# Patient Record
Sex: FEMALE | Race: BLACK OR AFRICAN AMERICAN | NOT HISPANIC OR LATINO | URBAN - METROPOLITAN AREA
[De-identification: names, ages, dates, MRNs, and addresses within clinical notes are randomized per-mention and may not be internally consistent; named-entity substitution may affect disease eponyms.]

---

## 2021-11-01 DIAGNOSIS — R10.30 LOWER ABDOMINAL PAIN: Primary | ICD-10-CM

## 2021-11-01 DIAGNOSIS — N83.00 FOLLICULAR CYST OF OVARY: ICD-10-CM

## 2021-11-01 DIAGNOSIS — D39.11 NEOPLASM OF UNCERTAIN BEHAVIOR OF RIGHT OVARY: ICD-10-CM

## 2021-12-01 ENCOUNTER — OFFICE VISIT (OUTPATIENT)
Dept: OBSTETRICS AND GYNECOLOGY | Facility: CLINIC | Age: 26
End: 2021-12-01
Payer: COMMERCIAL

## 2021-12-01 VITALS
SYSTOLIC BLOOD PRESSURE: 125 MMHG | DIASTOLIC BLOOD PRESSURE: 80 MMHG | BODY MASS INDEX: 25.33 KG/M2 | WEIGHT: 152 LBS | HEIGHT: 65 IN | HEART RATE: 100 BPM

## 2021-12-01 DIAGNOSIS — N80.9 ENDOMETRIOSIS: ICD-10-CM

## 2021-12-01 DIAGNOSIS — N80.9 ENDOMETRIOSIS DETERMINED BY LAPAROSCOPY: ICD-10-CM

## 2021-12-01 DIAGNOSIS — N83.201 CYST OF RIGHT OVARY: ICD-10-CM

## 2021-12-01 DIAGNOSIS — R10.2 PELVIC PAIN: Primary | ICD-10-CM

## 2021-12-01 PROCEDURE — 99203 PR OFFICE/OUTPT VISIT, NEW, LEVL III, 30-44 MIN: ICD-10-PCS | Mod: S$GLB,,, | Performed by: OBSTETRICS & GYNECOLOGY

## 2021-12-01 PROCEDURE — 99203 OFFICE O/P NEW LOW 30 MIN: CPT | Mod: S$GLB,,, | Performed by: OBSTETRICS & GYNECOLOGY

## 2021-12-03 ENCOUNTER — TELEPHONE (OUTPATIENT)
Dept: OBSTETRICS AND GYNECOLOGY | Facility: CLINIC | Age: 26
End: 2021-12-03
Payer: COMMERCIAL

## 2021-12-09 ENCOUNTER — TELEPHONE (OUTPATIENT)
Dept: OBSTETRICS AND GYNECOLOGY | Facility: CLINIC | Age: 26
End: 2021-12-09
Payer: COMMERCIAL

## 2021-12-17 ENCOUNTER — TELEPHONE (OUTPATIENT)
Dept: OBSTETRICS AND GYNECOLOGY | Facility: CLINIC | Age: 26
End: 2021-12-17
Payer: COMMERCIAL

## 2021-12-23 DIAGNOSIS — N80.9 ENDOMETRIOSIS: ICD-10-CM

## 2021-12-23 DIAGNOSIS — R10.2 PELVIC PAIN: Primary | ICD-10-CM

## 2021-12-23 DIAGNOSIS — N83.201 CYST OF RIGHT OVARY: ICD-10-CM

## 2022-01-18 ENCOUNTER — OFFICE VISIT (OUTPATIENT)
Dept: OBSTETRICS AND GYNECOLOGY | Facility: CLINIC | Age: 27
End: 2022-01-18
Payer: COMMERCIAL

## 2022-01-18 VITALS
HEART RATE: 84 BPM | WEIGHT: 150 LBS | BODY MASS INDEX: 24.96 KG/M2 | DIASTOLIC BLOOD PRESSURE: 82 MMHG | SYSTOLIC BLOOD PRESSURE: 121 MMHG

## 2022-01-18 DIAGNOSIS — R10.2 PELVIC PAIN: ICD-10-CM

## 2022-01-18 DIAGNOSIS — N83.201 CYST OF RIGHT OVARY: Primary | ICD-10-CM

## 2022-01-18 LAB
ANION GAP SERPL CALC-SCNC: 7 MMOL/L (ref 3–11)
ANISOCYTOSIS: NORMAL
APPEARANCE, UA: CLEAR
B-HCG UR QL: NEGATIVE
BACTERIA SPEC CULT: ABNORMAL /HPF
BASOPHILS NFR BLD: 0.8 % (ref 0–3)
BILIRUB UR QL STRIP: NEGATIVE MG/DL
BUN SERPL-MCNC: 8 MG/DL (ref 7–18)
BUN/CREAT SERPL: 11.11 RATIO (ref 7–18)
CALCIUM SERPL-MCNC: 9 MG/DL (ref 8.8–10.5)
CHLORIDE SERPL-SCNC: 106 MMOL/L (ref 100–108)
CO2 SERPL-SCNC: 27 MMOL/L (ref 21–32)
COLOR UR: ABNORMAL
CREAT SERPL-MCNC: 0.72 MG/DL (ref 0.55–1.02)
EOSINOPHIL NFR BLD: 4 % (ref 1–3)
ERYTHROCYTE [DISTWIDTH] IN BLOOD BY AUTOMATED COUNT: 18.7 % (ref 12.5–18)
GFR ESTIMATION: > 60
GLUCOSE (UA): NORMAL MG/DL
GLUCOSE SERPL-MCNC: 80 MG/DL (ref 70–110)
HCT VFR BLD AUTO: 28.4 % (ref 37–47)
HGB BLD-MCNC: 7.5 G/DL (ref 12–16)
HGB UR QL STRIP: 150 /UL
HYPOCHROMIA BLD QL SMEAR: NORMAL
KETONES UR QL STRIP: NEGATIVE MG/DL
LEUKOCYTE ESTERASE UR QL STRIP: NEGATIVE /UL
LYMPHOCYTES NFR BLD: 32.3 % (ref 25–40)
MCH RBC QN AUTO: 16.7 PG (ref 27–31.2)
MCHC RBC AUTO-ENTMCNC: 26.4 G/DL (ref 31.8–35.4)
MCV RBC AUTO: 63.4 FL (ref 80–97)
MICROCYTES BLD QL SMEAR: NORMAL
MONOCYTES NFR BLD: 11.5 % (ref 1–15)
NEUTROPHILS # BLD AUTO: 2.05 10*3/UL (ref 1.8–7.7)
NEUTROPHILS NFR BLD: 51.1 % (ref 37–80)
NITRITE UR QL STRIP: NEGATIVE
NUCLEATED RED BLOOD CELLS: 0 %
PH UR STRIP: 6.5 PH (ref 5–9)
PLATELETS: 490 10*3/UL (ref 142–424)
POTASSIUM SERPL-SCNC: 4.3 MMOL/L (ref 3.6–5.2)
PROT UR QL STRIP: NEGATIVE MG/DL
RBC # BLD AUTO: 4.48 10*6/UL (ref 4.2–5.4)
RBC #/AREA URNS HPF: ABNORMAL /HPF (ref 0–2)
SERVICE COMMENT 03: ABNORMAL
SODIUM BLD-SCNC: 140 MMOL/L (ref 135–145)
SP GR UR STRIP: 1.01 (ref 1–1.03)
SPECIMEN COLLECTION METHOD, URINE: ABNORMAL
SQUAMOUS EPITHELIAL, UA: ABNORMAL /LPF
TARGETS: NORMAL
UROBILINOGEN UR STRIP-ACNC: NORMAL MG/DL
WBC # BLD: 4 10*3/UL (ref 4.6–10.2)
WBC #/AREA URNS HPF: ABNORMAL /HPF (ref 0–5)

## 2022-01-18 PROCEDURE — 99499 UNLISTED E&M SERVICE: CPT | Mod: S$GLB,,, | Performed by: OBSTETRICS & GYNECOLOGY

## 2022-01-18 PROCEDURE — 99499 NO LOS: ICD-10-PCS | Mod: S$GLB,,, | Performed by: OBSTETRICS & GYNECOLOGY

## 2022-01-18 NOTE — PROGRESS NOTES
26 y.o.  presents for pre-op H&P for pelvic pain and ov mass.  Patient's last menstrual period was 2022..    No past medical history on file.  Past Surgical History:   Procedure Laterality Date    DIAGNOSTIC LAPAROSCOPY      Removal of endometriomas     No family history on file.  Review of patient's allergies indicates:  No Known Allergies  No current outpatient medications on file.  Social History     Socioeconomic History    Marital status: Single   Tobacco Use    Smoking status: Never Smoker    Smokeless tobacco: Never Used   Substance and Sexual Activity    Alcohol use: Never    Drug use: Never    Sexual activity: Yes     Partners: Male     Birth control/protection: None     Comment: TTC       Review of Systems   Constitutional: Negative for chills and fever.   Respiratory: Negative for shortness of breath.    Cardiovascular: Negative for chest pain.   Gastrointestinal: Positive for abdominal pain. Negative for blood in stool, constipation, diarrhea, nausea, vomiting and reflux.   Genitourinary: Negative for dysmenorrhea, dyspareunia, dysuria, hematuria, hot flashes, menorrhagia, menstrual problem, pelvic pain, vaginal bleeding, vaginal discharge, postcoital bleeding and vaginal dryness.   Musculoskeletal: Negative for arthralgias and joint swelling.   Integumentary:  Negative for rash and hair changes.   Psychiatric/Behavioral: Negative for depression. The patient is not nervous/anxious.        Vitals:    22 0909   BP: 121/82   Pulse: 84       Physical Exam:   Constitutional: She appears well-developed and well-nourished. No distress.    HENT:   Head: Normocephalic.    Eyes: Conjunctivae and EOM are normal.    Neck: No tracheal deviation present. No thyromegaly present.    Cardiovascular: Exam reveals no clubbing, no cyanosis and no edema.     Pulmonary/Chest: Effort normal. No respiratory distress.        Abdominal: Soft. She exhibits mass.             Musculoskeletal: Normal range  of motion and moves all extremeties.        Skin: No rash noted. She is not diaphoretic. No cyanosis. Nails show no clubbing.    Psychiatric: She has a normal mood and affect. Her behavior is normal. Judgment and thought content normal.         Last ultrasound 9 cm cyst    Assessment: ov cyst and plevic pain    Plan: dx scope, cystectomy, chromotubation  I have discussed the risks, benefits, indications, and alternatives of the procedure in detail.  The patient verbalizes her understanding.  All questions answered.  Consents signed.  The patient agrees to proceed to proceed as planned.

## 2022-01-19 ENCOUNTER — OUTSIDE PLACE OF SERVICE (OUTPATIENT)
Dept: OBSTETRICS AND GYNECOLOGY | Facility: CLINIC | Age: 27
End: 2022-01-19

## 2022-01-19 ENCOUNTER — OUTSIDE PLACE OF SERVICE (OUTPATIENT)
Dept: OBSTETRICS AND GYNECOLOGY | Facility: CLINIC | Age: 27
End: 2022-01-19
Payer: COMMERCIAL

## 2022-01-19 PROCEDURE — 58662 PR LAP,FULGURATE/EXCISE LESIONS: ICD-10-PCS | Mod: 80,,, | Performed by: STUDENT IN AN ORGANIZED HEALTH CARE EDUCATION/TRAINING PROGRAM

## 2022-01-19 PROCEDURE — 58662 LAPAROSCOPY EXCISE LESIONS: CPT | Mod: ,,, | Performed by: OBSTETRICS & GYNECOLOGY

## 2022-01-19 PROCEDURE — 58662 PR LAP,FULGURATE/EXCISE LESIONS: ICD-10-PCS | Mod: ,,, | Performed by: OBSTETRICS & GYNECOLOGY

## 2022-01-19 PROCEDURE — 58662 LAPAROSCOPY EXCISE LESIONS: CPT | Mod: 80,,, | Performed by: STUDENT IN AN ORGANIZED HEALTH CARE EDUCATION/TRAINING PROGRAM

## 2022-01-20 LAB — SPECIMEN TO PATHOLOGY: NORMAL

## 2022-02-01 ENCOUNTER — OFFICE VISIT (OUTPATIENT)
Dept: OBSTETRICS AND GYNECOLOGY | Facility: CLINIC | Age: 27
End: 2022-02-01
Payer: COMMERCIAL

## 2022-02-01 ENCOUNTER — PATIENT MESSAGE (OUTPATIENT)
Dept: OBSTETRICS AND GYNECOLOGY | Facility: CLINIC | Age: 27
End: 2022-02-01

## 2022-02-01 VITALS
HEART RATE: 89 BPM | DIASTOLIC BLOOD PRESSURE: 79 MMHG | WEIGHT: 146 LBS | SYSTOLIC BLOOD PRESSURE: 115 MMHG | BODY MASS INDEX: 24.3 KG/M2

## 2022-02-01 DIAGNOSIS — Z09 POSTOP CHECK: Primary | ICD-10-CM

## 2022-02-01 DIAGNOSIS — D64.9 ANEMIA, UNSPECIFIED TYPE: ICD-10-CM

## 2022-02-01 PROCEDURE — 99499 NO LOS: ICD-10-PCS | Mod: S$GLB,,, | Performed by: OBSTETRICS & GYNECOLOGY

## 2022-02-01 PROCEDURE — 99499 UNLISTED E&M SERVICE: CPT | Mod: S$GLB,,, | Performed by: OBSTETRICS & GYNECOLOGY

## 2022-02-01 NOTE — PROGRESS NOTES
CC: Postop visit    Jhoana Yañez is a 26 y.o. female  post-op from a dx scope and cyst drainage.  Patient is doing well without complaints.     The pathology revealed:  Serous cystadenoma    No past medical history on file.  Past Surgical History:   Procedure Laterality Date    DIAGNOSTIC LAPAROSCOPY      Removal of endometriomas         ROS:  GENERAL: No fever, chills, fatigability or weight loss.  VULVAR: No pain, no lesions and no itching.  VAGINAL: No relaxation, no itching, no discharge, no abnormal bleeding and no lesions.  ABDOMEN: No abdominal pain. Denies nausea. Denies vomiting. No diarrhea. No constipation  BREAST: Denies pain. No lumps. No discharge.  URINARY: No incontinence, no nocturia, no frequency and no dysuria.  CARDIOVASCULAR: No chest pain. No shortness of breath. No leg cramps.  NEUROLOGICAL: No headaches. No vision changes.    PE:     /79   Pulse 89   Wt 66.2 kg (146 lb)   LMP 2022   BMI 24.30 kg/m²     PELVIC: Normal external female genitalia without lesions. Normal hair distribution. Adequate perineal body, normal urethral meatus. Vagina moist and without lesions or discharge. No significant cystocele or rectocele. Uterus and cervix surgically absent. Vaginal cuff intact and appears normal. Bimanual exam revealed no masses, tenderness or abnormality.              ICD-10-CM ICD-9-CM    1. Postop check  Z09 V67.00    2. Anemia, unspecified type  D64.9 285.9 Ambulatory referral/consult to Hematology / Oncology         No follow-ups on file.    [facility]  Don referal

## 2022-02-02 ENCOUNTER — TELEPHONE (OUTPATIENT)
Dept: HEMATOLOGY/ONCOLOGY | Facility: CLINIC | Age: 27
End: 2022-02-02
Payer: COMMERCIAL

## 2022-02-02 NOTE — TELEPHONE ENCOUNTER
Spoke with the patient regarding referral. Appointment date and time provided. Location given. TTRN

## 2022-02-03 ENCOUNTER — OFFICE VISIT (OUTPATIENT)
Dept: HEMATOLOGY/ONCOLOGY | Facility: CLINIC | Age: 27
End: 2022-02-03
Payer: COMMERCIAL

## 2022-02-03 VITALS
HEART RATE: 85 BPM | BODY MASS INDEX: 24.61 KG/M2 | HEIGHT: 65 IN | OXYGEN SATURATION: 100 % | TEMPERATURE: 99 F | DIASTOLIC BLOOD PRESSURE: 75 MMHG | WEIGHT: 147.69 LBS | RESPIRATION RATE: 16 BRPM | SYSTOLIC BLOOD PRESSURE: 114 MMHG

## 2022-02-03 DIAGNOSIS — D50.9 HYPOCHROMIC MICROCYTIC ANEMIA: Primary | ICD-10-CM

## 2022-02-03 DIAGNOSIS — D64.89 ANEMIA DUE TO OTHER CAUSE, NOT CLASSIFIED: ICD-10-CM

## 2022-02-03 DIAGNOSIS — D64.9 ANEMIA, UNSPECIFIED TYPE: ICD-10-CM

## 2022-02-03 LAB
% SATURATION: 4 % (ref 20–50)
ANISOCYTOSIS: NORMAL
BASOPHILS NFR BLD: 1.1 % (ref 0–3)
EOSINOPHIL NFR BLD: 8.2 % (ref 1–3)
ERYTHROCYTE [DISTWIDTH] IN BLOOD BY AUTOMATED COUNT: 20.7 % (ref 12.5–18)
HCT VFR BLD AUTO: 31.9 % (ref 37–47)
HGB BLD-MCNC: 8.4 G/DL (ref 12–16)
HYPOCHROMIA BLD QL SMEAR: NORMAL
IRON: 14 UG/DL (ref 26–170)
LYMPHOCYTES NFR BLD: 30.1 % (ref 25–40)
MCH RBC QN AUTO: 17 PG (ref 27–31.2)
MCHC RBC AUTO-ENTMCNC: 26.3 G/DL (ref 31.8–35.4)
MCV RBC AUTO: 64.7 FL (ref 80–97)
MICROCYTES BLD QL SMEAR: NORMAL
MONOCYTES NFR BLD: 13.1 % (ref 1–15)
NEUTROPHILS # BLD AUTO: 2.12 10*3/UL (ref 1.8–7.7)
NEUTROPHILS NFR BLD: 47.3 % (ref 37–80)
NUCLEATED RED BLOOD CELLS: 0 %
PLATELETS: 432 10*3/UL (ref 142–424)
RBC # BLD AUTO: 4.93 10*6/UL (ref 4.2–5.4)
TOTAL IRON BINDING CAPACITY: 355 UG/DL (ref 250–450)
WBC # BLD: 4.5 10*3/UL (ref 4.6–10.2)

## 2022-02-03 PROCEDURE — 99205 PR OFFICE/OUTPT VISIT, NEW, LEVL V, 60-74 MIN: ICD-10-PCS | Mod: S$GLB,,, | Performed by: INTERNAL MEDICINE

## 2022-02-03 PROCEDURE — 99205 OFFICE O/P NEW HI 60 MIN: CPT | Mod: S$GLB,,, | Performed by: INTERNAL MEDICINE

## 2022-02-03 NOTE — PROGRESS NOTES
HEMATOLOGY INITIAL CONSULTATION NOTE    Patient ID: Jhoana Yañez is a 26 y.o. female.    Chief Complaint: Anemia    HPI:  Patient is a 26-year-old female  who recently underwent a diagnostic Laparo scopy for removal of endometriomas and cyst drainage by OBGYN service.  She was noted to be severely anemic with hemoglobin of 7.5 an MCV of 63.4.  She was referred to us for further evaluation and presents to our clinic.  Reports feeling tired and fatigue all the time.  Denies any history of sickle cell or thalassemias in the family.          Pathology:    OVARY, RIGHT CYST, EXCISION:   - CHRONICALLY INFLAMED BENIGN SEROUS CYSTADENOMA        Past Medical History:   Diagnosis Date    Anemia     Endometriosis        Family History   Problem Relation Age of Onset    Hypertension Mother        Social History     Socioeconomic History    Marital status: Single   Tobacco Use    Smoking status: Never Smoker    Smokeless tobacco: Never Used   Substance and Sexual Activity    Alcohol use: Never    Drug use: Never    Sexual activity: Yes     Partners: Male     Birth control/protection: None     Comment: TTC         Past Surgical History:   Procedure Laterality Date    DIAGNOSTIC LAPAROSCOPY      Removal of endometriomas       Review of systems:  Review of Systems   Constitutional: Positive for activity change and fatigue. Negative for appetite change, chills, diaphoresis and unexpected weight change.   HENT: Negative for congestion, facial swelling, hearing loss, mouth sores, trouble swallowing and voice change.    Eyes: Negative for photophobia, pain, discharge and itching.   Respiratory: Negative for apnea, cough, choking, chest tightness and shortness of breath.    Cardiovascular: Negative for chest pain, palpitations and leg swelling.   Gastrointestinal: Negative for abdominal distention, abdominal pain, anal bleeding and blood in stool.   Endocrine: Negative for cold intolerance, heat  intolerance, polydipsia and polyphagia.   Genitourinary: Negative for difficulty urinating, dysuria, flank pain and hematuria.        +ve for abdominal pain and recent laparoscopy. See HPI   Musculoskeletal: Negative for arthralgias, back pain, joint swelling, myalgias, neck pain and neck stiffness.   Skin: Negative for color change, pallor and wound.   Allergic/Immunologic: Negative for environmental allergies, food allergies and immunocompromised state.   Neurological: Negative for dizziness, seizures, facial asymmetry, speech difficulty, light-headedness, numbness and headaches.   Hematological: Negative for adenopathy. Does not bruise/bleed easily.   Psychiatric/Behavioral: Negative for agitation, behavioral problems, confusion, decreased concentration and sleep disturbance.               Physical Exam  Vitals and nursing note reviewed.   Constitutional:       General: She is not in acute distress.     Appearance: Normal appearance. She is not ill-appearing.      Comments: +ve for pallor   HENT:      Head: Normocephalic and atraumatic.      Nose: No congestion or rhinorrhea.   Eyes:      General: No scleral icterus.     Extraocular Movements: Extraocular movements intact.      Pupils: Pupils are equal, round, and reactive to light.   Cardiovascular:      Rate and Rhythm: Normal rate and regular rhythm.      Pulses: Normal pulses.      Heart sounds: Normal heart sounds. No murmur heard.  No gallop.    Pulmonary:      Effort: Pulmonary effort is normal. No respiratory distress.      Breath sounds: Normal breath sounds. No stridor. No wheezing or rhonchi.   Abdominal:      General: Bowel sounds are normal. There is no distension.      Palpations: There is no mass.      Tenderness: There is no abdominal tenderness. There is no guarding.   Musculoskeletal:         General: No swelling, tenderness, deformity or signs of injury. Normal range of motion.      Cervical back: Normal range of motion and neck supple. No  rigidity. No muscular tenderness.      Right lower leg: No edema.      Left lower leg: No edema.   Skin:     General: Skin is warm.      Coloration: Skin is not jaundiced or pale.      Findings: No bruising or lesion.   Neurological:      General: No focal deficit present.      Mental Status: She is alert and oriented to person, place, and time.      Cranial Nerves: No cranial nerve deficit.      Sensory: No sensory deficit.      Motor: No weakness.      Gait: Gait normal.   Psychiatric:         Mood and Affect: Mood normal.         Behavior: Behavior normal.         Thought Content: Thought content normal.       Vitals:    02/03/22 1058   BP: 114/75   Pulse: 85   Resp: 16   Temp: 98.5 °F (36.9 °C)   Body surface area is 1.75 meters squared.    Assessment/Plan:       1. Anemia:    == Reviewed recent labs done by OBGYN service where she was noted to have a hemoglobin of 7.5 with hematocrit of 28.4 with extremely microcytic, hypochromic anemia.  She was also noted to have anisocytosis 1+ and elevated platelet count at 490 which is likely physiological bone marrow response to anemia.   == Since she has extreme microcytosis thalassemia is in the differential but looking at her clinical history and picture findings strongly suggest iron deficiency anemia.  Will obtain iron profile for completion.  I will also obtain hemoglobin electrophoresis to rule out underlying thalassemia.  In the interim I would recommend she starts on p.o. iron supplementation and take vitamin-C to enhance absorption.  If no improvement noted in the next 4-6 weeks based on her blood counts then would recommend IV iron supplementation with Feraheme.    Iron profile  Hgb electrophoresis    RTC in 6 weeks for MD visit with labs prior: CBC, reticulocytes, iron profile    Pt is instructed to RTC with labs for continued monitoring of treatment as instructed.     Total time spent in counseling and discussion about further management options including  relevant lab work, treatment,  prognosis, medications and intended side effects was more than 60 minutes. More than 50 % of the time was spent in counseling and coordination of care.  I spent a total of 60 minutes on the day of the visit.This includes face to face time and non-face to face time preparing to see the patient (eg, review of tests), Obtaining and/or reviewing separately obtained history, Documenting clinical information in the electronic or other health record, Independently interpreting resultsand communicating results to the patient/family/caregiver, or Care coordination.

## 2022-02-07 DIAGNOSIS — N83.201 CYST OF RIGHT OVARY: Primary | ICD-10-CM

## 2022-02-07 RX ORDER — NORGESTIMATE AND ETHINYL ESTRADIOL 7DAYSX3 28
1 KIT ORAL DAILY
Qty: 30 TABLET | Refills: 11 | Status: SHIPPED | OUTPATIENT
Start: 2022-02-07 | End: 2023-02-16 | Stop reason: ALTCHOICE

## 2022-02-08 LAB
HEMOGLOBIN A1: 98.4 % (ref 95–97.9)
HEMOGLOBIN A2: 1.4 % (ref 2–3.5)
HEMOGLOBIN C: 0 % (ref 0–0)
HEMOGLOBIN E: 0 % (ref 0–0)
HEMOGLOBIN F: 0.2 % (ref 0–2.1)
HEMOGLOBIN OTHER: ABNORMAL % (ref 0–0)
HEMOGLOBIN S: 0 % (ref 0–0)
HEMOGLOBIN, CAPILLARY ELECTROPHORESIS: ABNORMAL
HEMOGLOBINOPATHY INTERPRETATION: ABNORMAL
SICKLE CELL SCREEN: ABNORMAL

## 2022-03-03 ENCOUNTER — PATIENT MESSAGE (OUTPATIENT)
Dept: OBSTETRICS AND GYNECOLOGY | Facility: CLINIC | Age: 27
End: 2022-03-03
Payer: COMMERCIAL

## 2022-03-03 DIAGNOSIS — N80.9 ENDOMETRIOSIS: Primary | ICD-10-CM

## 2022-03-03 RX ORDER — DROSPIRENONE AND ETHINYL ESTRADIOL 0.02-3(28)
1 KIT ORAL DAILY
Qty: 30 TABLET | Refills: 11 | Status: SHIPPED | OUTPATIENT
Start: 2022-03-03 | End: 2023-02-16 | Stop reason: ALTCHOICE

## 2022-03-16 DIAGNOSIS — D50.9 HYPOCHROMIC MICROCYTIC ANEMIA: Primary | ICD-10-CM

## 2022-03-17 ENCOUNTER — OFFICE VISIT (OUTPATIENT)
Dept: HEMATOLOGY/ONCOLOGY | Facility: CLINIC | Age: 27
End: 2022-03-17
Payer: COMMERCIAL

## 2022-03-17 ENCOUNTER — CLINICAL SUPPORT (OUTPATIENT)
Dept: HEMATOLOGY/ONCOLOGY | Facility: CLINIC | Age: 27
End: 2022-03-17
Payer: COMMERCIAL

## 2022-03-17 VITALS
TEMPERATURE: 98 F | SYSTOLIC BLOOD PRESSURE: 111 MMHG | WEIGHT: 148.19 LBS | DIASTOLIC BLOOD PRESSURE: 78 MMHG | BODY MASS INDEX: 24.69 KG/M2 | OXYGEN SATURATION: 99 % | RESPIRATION RATE: 16 BRPM | HEART RATE: 70 BPM | HEIGHT: 65 IN

## 2022-03-17 DIAGNOSIS — D50.9 HYPOCHROMIC MICROCYTIC ANEMIA: ICD-10-CM

## 2022-03-17 DIAGNOSIS — D50.9 HYPOCHROMIC MICROCYTIC ANEMIA: Primary | ICD-10-CM

## 2022-03-17 PROCEDURE — 99214 OFFICE O/P EST MOD 30 MIN: CPT | Mod: S$GLB,,, | Performed by: NURSE PRACTITIONER

## 2022-03-17 PROCEDURE — 99214 PR OFFICE/OUTPT VISIT, EST, LEVL IV, 30-39 MIN: ICD-10-PCS | Mod: S$GLB,,, | Performed by: NURSE PRACTITIONER

## 2022-03-17 NOTE — PROGRESS NOTES
HEMATOLOGY INITIAL CONSULTATION NOTE    Patient ID: Jhoana Yañez is a 26 y.o. female.    Chief Complaint: Anemia    HPI:  Patient is a 26-year-old female  who recently underwent a diagnostic Laparo scopy for removal of endometriomas and cyst drainage by OBGYN service.  She was noted to be severely anemic with hemoglobin of 7.5 an MCV of 63.4.  She was referred to us for further evaluation and presents to our clinic.  Reports feeling tired and fatigue all the time.  Denies any history of sickle cell or thalassemias in the family.          Pathology:    OVARY, RIGHT CYST, EXCISION:   - CHRONICALLY INFLAMED BENIGN SEROUS CYSTADENOMA          Past Medical History:   Diagnosis Date    Anemia     Endometriosis        Family History   Problem Relation Age of Onset    Hypertension Mother        Social History     Socioeconomic History    Marital status:    Tobacco Use    Smoking status: Never Smoker    Smokeless tobacco: Never Used   Substance and Sexual Activity    Alcohol use: Never    Drug use: Never    Sexual activity: Yes     Partners: Male     Birth control/protection: None     Comment: TTC         Past Surgical History:   Procedure Laterality Date    DIAGNOSTIC LAPAROSCOPY      Removal of endometriomas       Review of systems:  Review of Systems   Constitutional: Positive for activity change and fatigue. Negative for appetite change, chills, diaphoresis and unexpected weight change.   HENT: Negative for congestion, facial swelling, hearing loss, mouth sores, trouble swallowing and voice change.    Eyes: Negative for photophobia, pain, discharge and itching.   Respiratory: Negative for apnea, cough, choking, chest tightness and shortness of breath.    Cardiovascular: Negative for chest pain, palpitations and leg swelling.   Gastrointestinal: Negative for abdominal distention, abdominal pain, anal bleeding and blood in stool.   Endocrine: Negative for cold intolerance, heat  intolerance, polydipsia and polyphagia.   Genitourinary: Negative for difficulty urinating, dysuria, flank pain and hematuria.        +ve for abdominal pain and recent laparoscopy. See HPI   Musculoskeletal: Negative for arthralgias, back pain, joint swelling, myalgias, neck pain and neck stiffness.   Skin: Negative for color change, pallor and wound.   Allergic/Immunologic: Negative for environmental allergies, food allergies and immunocompromised state.   Neurological: Negative for dizziness, seizures, facial asymmetry, speech difficulty, light-headedness, numbness and headaches.   Hematological: Negative for adenopathy. Does not bruise/bleed easily.   Psychiatric/Behavioral: Negative for agitation, behavioral problems, confusion, decreased concentration and sleep disturbance.               Physical Exam  Vitals and nursing note reviewed.   Constitutional:       General: She is not in acute distress.     Appearance: Normal appearance. She is not ill-appearing.      Comments: +ve for pallor   HENT:      Head: Normocephalic and atraumatic.      Nose: No congestion or rhinorrhea.   Eyes:      General: No scleral icterus.     Extraocular Movements: Extraocular movements intact.      Pupils: Pupils are equal, round, and reactive to light.   Cardiovascular:      Rate and Rhythm: Normal rate and regular rhythm.      Pulses: Normal pulses.      Heart sounds: Normal heart sounds. No murmur heard.    No gallop.   Pulmonary:      Effort: Pulmonary effort is normal. No respiratory distress.      Breath sounds: Normal breath sounds. No stridor. No wheezing or rhonchi.   Abdominal:      General: Bowel sounds are normal. There is no distension.      Palpations: There is no mass.      Tenderness: There is no abdominal tenderness. There is no guarding.   Musculoskeletal:         General: No swelling, tenderness, deformity or signs of injury. Normal range of motion.      Cervical back: Normal range of motion and neck supple. No  rigidity. No muscular tenderness.      Right lower leg: No edema.      Left lower leg: No edema.   Skin:     General: Skin is warm.      Coloration: Skin is not jaundiced or pale.      Findings: No bruising or lesion.   Neurological:      General: No focal deficit present.      Mental Status: She is alert and oriented to person, place, and time.      Cranial Nerves: No cranial nerve deficit.      Sensory: No sensory deficit.      Motor: No weakness.      Gait: Gait normal.   Psychiatric:         Mood and Affect: Mood normal.         Behavior: Behavior normal.         Thought Content: Thought content normal.       There were no vitals filed for this visit.There is no height or weight on file to calculate BSA.    Assessment/Plan:       1. Anemia:    == Reviewed recent labs done by OBGYN service where she was noted to have a hemoglobin of 7.5 with hematocrit of 28.4 with extremely microcytic, hypochromic anemia.  She was also noted to have anisocytosis 1+ and elevated platelet count at 490 which is likely physiological bone marrow response to anemia.   == Since she has extreme microcytosis thalassemia is in the differential but looking at her clinical history and picture findings strongly suggest iron deficiency anemia.  Will obtain iron profile for completion.  I will also obtain hemoglobin electrophoresis to rule out underlying thalassemia.  In the interim I would recommend she starts on p.o. iron supplementation and take vitamin-C to enhance absorption.  If no improvement noted in the next 4-6 weeks based on her blood counts then would recommend IV iron supplementation with Feraheme.    ==3/17/2022: Patient with symptomatic iron deficency anemia. She reports only able to tolerate po iron once daily due to severe nausea. She is able to take one dose before bed, otherwise she has severe nausea. Patient reports fatigue, pica, cold intolerance. Plan is ferriheme then rtc in 2 months with repeat labs    Total time spent  in counseling and discussion about further management options including relevant lab work, treatment,  prognosis, medications and intended side effects was more than 25 minutes. More than 50 % of the time was spent in counseling and coordination of care.  I spent a total of 60 minutes on the day of the visit.This includes face to face time and non-face to face time preparing to see the patient (eg, review of tests), Obtaining and/or reviewing separately obtained history, Documenting clinical information in the electronic or other health record, Independently interpreting resultsand communicating results to the patient/family/caregiver, or Care coordination.

## 2022-03-18 PROBLEM — D50.9 HYPOCHROMIC MICROCYTIC ANEMIA: Status: ACTIVE | Noted: 2022-03-18

## 2022-03-18 RX ORDER — SODIUM CHLORIDE 0.9 % (FLUSH) 0.9 %
10 SYRINGE (ML) INJECTION
Status: CANCELLED | OUTPATIENT
Start: 2022-03-18

## 2022-03-18 RX ORDER — HEPARIN 100 UNIT/ML
500 SYRINGE INTRAVENOUS
Status: CANCELLED | OUTPATIENT
Start: 2022-03-18

## 2022-03-18 RX ORDER — EPINEPHRINE 0.3 MG/.3ML
0.3 INJECTION SUBCUTANEOUS ONCE AS NEEDED
Status: CANCELLED | OUTPATIENT
Start: 2022-03-18

## 2022-03-18 RX ORDER — DIPHENHYDRAMINE HYDROCHLORIDE 50 MG/ML
50 INJECTION INTRAMUSCULAR; INTRAVENOUS ONCE AS NEEDED
Status: CANCELLED | OUTPATIENT
Start: 2022-03-18

## 2022-03-18 RX ORDER — METHYLPREDNISOLONE SOD SUCC 125 MG
125 VIAL (EA) INJECTION ONCE AS NEEDED
Status: CANCELLED | OUTPATIENT
Start: 2022-03-18

## 2022-03-23 DIAGNOSIS — D50.0 ANEMIA DUE TO CHRONIC BLOOD LOSS: Primary | ICD-10-CM

## 2022-03-23 RX ORDER — FERROUS SULFATE 325(65) MG
325 TABLET, DELAYED RELEASE (ENTERIC COATED) ORAL 2 TIMES DAILY
Qty: 60 TABLET | Refills: 6 | Status: SHIPPED | OUTPATIENT
Start: 2022-03-23 | End: 2023-02-16 | Stop reason: ALTCHOICE

## 2022-05-19 ENCOUNTER — OFFICE VISIT (OUTPATIENT)
Dept: HEMATOLOGY/ONCOLOGY | Facility: CLINIC | Age: 27
End: 2022-05-19
Payer: COMMERCIAL

## 2022-05-19 VITALS
OXYGEN SATURATION: 97 % | HEIGHT: 65 IN | RESPIRATION RATE: 18 BRPM | WEIGHT: 156 LBS | SYSTOLIC BLOOD PRESSURE: 116 MMHG | BODY MASS INDEX: 25.99 KG/M2 | DIASTOLIC BLOOD PRESSURE: 77 MMHG | HEART RATE: 74 BPM | TEMPERATURE: 98 F

## 2022-05-19 DIAGNOSIS — D50.9 HYPOCHROMIC MICROCYTIC ANEMIA: Primary | ICD-10-CM

## 2022-05-19 DIAGNOSIS — D50.0 ANEMIA DUE TO CHRONIC BLOOD LOSS: ICD-10-CM

## 2022-05-19 LAB
% SATURATION: 3 % (ref 20–50)
BASOPHILS NFR BLD: 0.5 % (ref 0–3)
EOSINOPHIL NFR BLD: 2.3 % (ref 1–3)
ERYTHROCYTE [DISTWIDTH] IN BLOOD BY AUTOMATED COUNT: 17.9 % (ref 12.5–18)
FERRITIN SERPL-MCNC: 27 NG/ML (ref 8–388)
HCT VFR BLD AUTO: 30.9 % (ref 37–47)
HGB BLD-MCNC: 8.6 G/DL (ref 12–16)
HYPOCHROMIA BLD QL SMEAR: NORMAL
IRON: 11 UG/DL (ref 26–170)
LYMPHOCYTES NFR BLD: 25.8 % (ref 25–40)
MCH RBC QN AUTO: 19 PG (ref 27–31.2)
MCHC RBC AUTO-ENTMCNC: 27.8 G/DL (ref 31.8–35.4)
MCV RBC AUTO: 68.2 FL (ref 80–97)
MICROCYTES BLD QL SMEAR: NORMAL
MONOCYTES NFR BLD: 10.2 % (ref 1–15)
NEUTROPHILS # BLD AUTO: 4.99 10*3/UL (ref 1.8–7.7)
NEUTROPHILS NFR BLD: 60.8 % (ref 37–80)
NUCLEATED RED BLOOD CELLS: 0 %
PLATELETS: 378 10*3/UL (ref 142–424)
RBC # BLD AUTO: 4.53 10*6/UL (ref 4.2–5.4)
RETIC %: 1.4 % (ref 0.1–1.5)
TOTAL IRON BINDING CAPACITY: 363 UG/DL (ref 250–450)
WBC # BLD: 8.2 10*3/UL (ref 4.6–10.2)

## 2022-05-19 PROCEDURE — 99214 PR OFFICE/OUTPT VISIT, EST, LEVL IV, 30-39 MIN: ICD-10-PCS | Mod: S$GLB,,, | Performed by: NURSE PRACTITIONER

## 2022-05-19 PROCEDURE — 99214 OFFICE O/P EST MOD 30 MIN: CPT | Mod: S$GLB,,, | Performed by: NURSE PRACTITIONER

## 2022-05-19 RX ORDER — SODIUM CHLORIDE 9 MG/ML
INJECTION, SOLUTION INTRAVENOUS CONTINUOUS
Status: CANCELLED | OUTPATIENT
Start: 2022-05-24

## 2022-05-19 RX ORDER — DIPHENHYDRAMINE HYDROCHLORIDE 50 MG/ML
50 INJECTION INTRAMUSCULAR; INTRAVENOUS ONCE AS NEEDED
Status: CANCELLED | OUTPATIENT
Start: 2022-05-23

## 2022-05-19 RX ORDER — SODIUM CHLORIDE 0.9 % (FLUSH) 0.9 %
10 SYRINGE (ML) INJECTION
Status: CANCELLED | OUTPATIENT
Start: 2022-05-23

## 2022-05-19 RX ORDER — HEPARIN 100 UNIT/ML
5 SYRINGE INTRAVENOUS
Status: CANCELLED | OUTPATIENT
Start: 2022-05-23

## 2022-05-19 RX ORDER — EPINEPHRINE 0.3 MG/.3ML
0.3 INJECTION SUBCUTANEOUS ONCE AS NEEDED
Status: CANCELLED | OUTPATIENT
Start: 2022-05-23

## 2022-05-19 RX ORDER — METHYLPREDNISOLONE SOD SUCC 125 MG
125 VIAL (EA) INJECTION ONCE AS NEEDED
Status: CANCELLED | OUTPATIENT
Start: 2022-05-23

## 2022-05-19 NOTE — PROGRESS NOTES
HEMATOLOGY INITIAL CONSULTATION NOTE    Patient ID: Jhoana Yañez is a 26 y.o. female.    Chief Complaint: Anemia    HPI:  Patient is a 26-year-old female  who recently underwent a diagnostic Laparo scopy for removal of endometriomas and cyst drainage by OBGYN service.  She was noted to be severely anemic with hemoglobin of 7.5 an MCV of 63.4.  She was referred to us for further evaluation and presents to our clinic.  Reports feeling tired and fatigue all the time.  Denies any history of sickle cell or thalassemias in the family.          Pathology:    OVARY, RIGHT CYST, EXCISION:   - CHRONICALLY INFLAMED BENIGN SEROUS CYSTADENOMA          Past Medical History:   Diagnosis Date    Anemia     Endometriosis        Family History   Problem Relation Age of Onset    Hypertension Mother        Social History     Socioeconomic History    Marital status:    Tobacco Use    Smoking status: Never Smoker    Smokeless tobacco: Never Used   Substance and Sexual Activity    Alcohol use: Never    Drug use: Never    Sexual activity: Yes     Partners: Male     Birth control/protection: None     Comment: TTC         Past Surgical History:   Procedure Laterality Date    DIAGNOSTIC LAPAROSCOPY      Removal of endometriomas       Review of systems:  Review of Systems   Constitutional: Positive for activity change and fatigue. Negative for appetite change, chills, diaphoresis and unexpected weight change.   HENT: Negative for congestion, facial swelling, hearing loss, mouth sores, trouble swallowing and voice change.    Eyes: Negative for photophobia, pain, discharge and itching.   Respiratory: Negative for apnea, cough, choking, chest tightness and shortness of breath.    Cardiovascular: Negative for chest pain, palpitations and leg swelling.   Gastrointestinal: Negative for abdominal distention, abdominal pain, anal bleeding and blood in stool.   Endocrine: Negative for cold intolerance, heat  intolerance, polydipsia and polyphagia.   Genitourinary: Negative for difficulty urinating, dysuria, flank pain and hematuria.        +ve for abdominal pain and recent laparoscopy. See HPI   Musculoskeletal: Negative for arthralgias, back pain, joint swelling, myalgias, neck pain and neck stiffness.   Skin: Negative for color change, pallor and wound.   Allergic/Immunologic: Negative for environmental allergies, food allergies and immunocompromised state.   Neurological: Negative for dizziness, seizures, facial asymmetry, speech difficulty, light-headedness, numbness and headaches.   Hematological: Negative for adenopathy. Does not bruise/bleed easily.   Psychiatric/Behavioral: Negative for agitation, behavioral problems, confusion, decreased concentration and sleep disturbance.               Physical Exam  Vitals and nursing note reviewed.   Constitutional:       General: She is not in acute distress.     Appearance: Normal appearance. She is not ill-appearing.      Comments: +ve for pallor   HENT:      Head: Normocephalic and atraumatic.      Nose: No congestion or rhinorrhea.   Eyes:      General: No scleral icterus.     Extraocular Movements: Extraocular movements intact.      Pupils: Pupils are equal, round, and reactive to light.   Cardiovascular:      Rate and Rhythm: Normal rate and regular rhythm.      Pulses: Normal pulses.      Heart sounds: Normal heart sounds. No murmur heard.    No gallop.   Pulmonary:      Effort: Pulmonary effort is normal. No respiratory distress.      Breath sounds: Normal breath sounds. No stridor. No wheezing or rhonchi.   Abdominal:      General: Bowel sounds are normal. There is no distension.      Palpations: There is no mass.      Tenderness: There is no abdominal tenderness. There is no guarding.   Musculoskeletal:         General: No swelling, tenderness, deformity or signs of injury. Normal range of motion.      Cervical back: Normal range of motion and neck supple. No  rigidity. No muscular tenderness.      Right lower leg: No edema.      Left lower leg: No edema.   Skin:     General: Skin is warm.      Coloration: Skin is not jaundiced or pale.      Findings: No bruising or lesion.   Neurological:      General: No focal deficit present.      Mental Status: She is alert and oriented to person, place, and time.      Cranial Nerves: No cranial nerve deficit.      Sensory: No sensory deficit.      Motor: No weakness.      Gait: Gait normal.   Psychiatric:         Mood and Affect: Mood normal.         Behavior: Behavior normal.         Thought Content: Thought content normal.       Vitals:    05/19/22 1436   BP: 116/77   Pulse: 74   Resp: 18   Temp: 98 °F (36.7 °C)   Body surface area is 1.8 meters squared.    Assessment/Plan:       1. Anemia:    == Reviewed recent labs done by OBGYN service where she was noted to have a hemoglobin of 7.5 with hematocrit of 28.4 with extremely microcytic, hypochromic anemia.  She was also noted to have anisocytosis 1+ and elevated platelet count at 490 which is likely physiological bone marrow response to anemia.   == Since she has extreme microcytosis thalassemia is in the differential but looking at her clinical history and picture findings strongly suggest iron deficiency anemia.  Will obtain iron profile for completion.  I will also obtain hemoglobin electrophoresis to rule out underlying thalassemia.  In the interim I would recommend she starts on p.o. iron supplementation and take vitamin-C to enhance absorption.  If no improvement noted in the next 4-6 weeks based on her blood counts then would recommend IV iron supplementation with Feraheme.    [facility]    ==3/17/2022: Patient with symptomatic iron deficency anemia. She reports only able to tolerate po iron once daily due to severe nausea. She is able to take one dose before bed, otherwise she has severe nausea. Patient reports fatigue, pica, cold intolerance. Plan is ferriheme  then rtc in 2 months with repeat labs    Total time spent in counseling and discussion about further management options including relevant lab work, treatment,  prognosis, medications and intended side effects was more than 25 minutes. More than 50 % of the time was spent in counseling and coordination of care.  I spent a total of 60 minutes on the day of the visit.This includes face to face time and non-face to face time preparing to see the patient (eg, review of tests), Obtaining and/or reviewing separately obtained history, Documenting clinical information in the electronic or other health record, Independently interpreting resultsand communicating results to the patient/family/caregiver, or Care coordination.

## 2022-05-19 NOTE — PROGRESS NOTES
HEMATOLOGY INITIAL CONSULTATION NOTE    Patient ID: Jhoana Yañez is a 26 y.o. female.    Chief Complaint: Anemia    HPI:  Patient is a 26-year-old female  who recently underwent a diagnostic Laparo scopy for removal of endometriomas and cyst drainage by OBGYN service.  She was noted to be severely anemic with hemoglobin of 7.5 an MCV of 63.4.  She was referred to us for further evaluation and presents to our clinic.  Reports feeling tired and fatigue all the time.  Denies any history of sickle cell or thalassemias in the family.          Pathology:    OVARY, RIGHT CYST, EXCISION:   - CHRONICALLY INFLAMED BENIGN SEROUS CYSTADENOMA          Past Medical History:   Diagnosis Date    Anemia     Endometriosis        Family History   Problem Relation Age of Onset    Hypertension Mother        Social History     Socioeconomic History    Marital status:    Tobacco Use    Smoking status: Never Smoker    Smokeless tobacco: Never Used   Substance and Sexual Activity    Alcohol use: Never    Drug use: Never    Sexual activity: Yes     Partners: Male     Birth control/protection: None     Comment: TTC         Past Surgical History:   Procedure Laterality Date    DIAGNOSTIC LAPAROSCOPY      Removal of endometriomas       Review of systems:  Review of Systems   Constitutional: Positive for activity change and fatigue. Negative for appetite change, chills, diaphoresis and unexpected weight change.   HENT: Negative for congestion, facial swelling, hearing loss, mouth sores, trouble swallowing and voice change.    Eyes: Negative for photophobia, pain, discharge and itching.   Respiratory: Negative for apnea, cough, choking, chest tightness and shortness of breath.    Cardiovascular: Negative for chest pain, palpitations and leg swelling.   Gastrointestinal: Negative for abdominal distention, abdominal pain, anal bleeding and blood in stool.   Endocrine: Negative for cold intolerance, heat  intolerance, polydipsia and polyphagia.   Genitourinary: Negative for difficulty urinating, dysuria, flank pain and hematuria.        +ve for abdominal pain and recent laparoscopy. See HPI   Musculoskeletal: Negative for arthralgias, back pain, joint swelling, myalgias, neck pain and neck stiffness.   Skin: Negative for color change, pallor and wound.   Allergic/Immunologic: Negative for environmental allergies, food allergies and immunocompromised state.   Neurological: Negative for dizziness, seizures, facial asymmetry, speech difficulty, light-headedness, numbness and headaches.   Hematological: Negative for adenopathy. Does not bruise/bleed easily.   Psychiatric/Behavioral: Negative for agitation, behavioral problems, confusion, decreased concentration and sleep disturbance.               Physical Exam  Vitals and nursing note reviewed.   Constitutional:       General: She is not in acute distress.     Appearance: Normal appearance. She is not ill-appearing.      Comments: +ve for pallor   HENT:      Head: Normocephalic and atraumatic.      Nose: No congestion or rhinorrhea.   Eyes:      General: No scleral icterus.     Extraocular Movements: Extraocular movements intact.      Pupils: Pupils are equal, round, and reactive to light.   Cardiovascular:      Rate and Rhythm: Normal rate and regular rhythm.      Pulses: Normal pulses.      Heart sounds: Normal heart sounds. No murmur heard.    No gallop.   Pulmonary:      Effort: Pulmonary effort is normal. No respiratory distress.      Breath sounds: Normal breath sounds. No stridor. No wheezing or rhonchi.   Abdominal:      General: Bowel sounds are normal. There is no distension.      Palpations: There is no mass.      Tenderness: There is no abdominal tenderness. There is no guarding.   Musculoskeletal:         General: No swelling, tenderness, deformity or signs of injury. Normal range of motion.      Cervical back: Normal range of motion and neck supple. No  rigidity. No muscular tenderness.      Right lower leg: No edema.      Left lower leg: No edema.   Skin:     General: Skin is warm.      Coloration: Skin is not jaundiced or pale.      Findings: No bruising or lesion.   Neurological:      General: No focal deficit present.      Mental Status: She is alert and oriented to person, place, and time.      Cranial Nerves: No cranial nerve deficit.      Sensory: No sensory deficit.      Motor: No weakness.      Gait: Gait normal.   Psychiatric:         Mood and Affect: Mood normal.         Behavior: Behavior normal.         Thought Content: Thought content normal.       Vitals:    05/19/22 1436   BP: 116/77   Pulse: 74   Resp: 18   Temp: 98 °F (36.7 °C)   Body surface area is 1.8 meters squared.    Assessment/Plan:       1. Anemia:    == Reviewed recent labs done by OBGYN service where she was noted to have a hemoglobin of 7.5 with hematocrit of 28.4 with extremely microcytic, hypochromic anemia.  She was also noted to have anisocytosis 1+ and elevated platelet count at 490 which is likely physiological bone marrow response to anemia.   == Since she has extreme microcytosis thalassemia is in the differential but looking at her clinical history and picture findings strongly suggest iron deficiency anemia.  Will obtain iron profile for completion.  I will also obtain hemoglobin electrophoresis to rule out underlying thalassemia.  In the interim I would recommend she starts on p.o. iron supplementation and take vitamin-C to enhance absorption.  If no improvement noted in the next 4-6 weeks based on her blood counts then would recommend IV iron supplementation with Feraheme.    ==3/17/2022: Patient with symptomatic iron deficency anemia. She reports only able to tolerate po iron once daily due to severe nausea. She is able to take one dose before bed, otherwise she has severe nausea. Patient reports fatigue, pica, cold intolerance. Plan is ferriheme then rtc in 2 months with  repeat labs    ==05/19/2022: Patient with symptomatic iron deficiency anemia. She has tried oral iron which caused nausea and vomiting. She has also tried liquid po iron in past which she also did not tolerate. She reports continued severe fatigue and cold intolerance. IV iron was denied by insurance previously. Will resubmit as patient is increasingly symptomatic and iron dropped from 18 at previous visit to 14 drawn on 5/19/2022. Iron saturation also dropped from 4 to 3. As she is not able to tolerate po iron and continues to have decrease she will likely need blood transfusion in future if IV iron is not approved. Educated on ER precautions if heart palpitations, dyspnea or worsening of symptoms.    Total time spent in counseling and discussion about further management options including relevant lab work, treatment,  prognosis, medications and intended side effects was more than 25 minutes. More than 50 % of the time was spent in counseling and coordination of care.  I spent a total of 60 minutes on the day of the visit.This includes face to face time and non-face to face time preparing to see the patient (eg, review of tests), Obtaining and/or reviewing separately obtained history, Documenting clinical information in the electronic or other health record, Independently interpreting resultsand communicating results to the patient/family/caregiver, or Care coordination.

## 2022-05-20 ENCOUNTER — PATIENT MESSAGE (OUTPATIENT)
Dept: HEMATOLOGY/ONCOLOGY | Facility: CLINIC | Age: 27
End: 2022-05-20
Payer: COMMERCIAL

## 2023-01-05 DIAGNOSIS — D64.9 ANEMIA, UNSPECIFIED TYPE: Primary | ICD-10-CM

## 2023-01-06 ENCOUNTER — TELEPHONE (OUTPATIENT)
Dept: HEMATOLOGY/ONCOLOGY | Facility: CLINIC | Age: 28
End: 2023-01-06
Payer: COMMERCIAL

## 2023-01-06 NOTE — TELEPHONE ENCOUNTER
Spoke to the patient regarding referral Appointment date, time, and lcoation provided. All questions answered. TTRN

## 2023-01-06 NOTE — TELEPHONE ENCOUNTER
Called the patient regarding referral. Patient did not answer. Left a VM with my direct number as well as the clinics number. TTRN

## 2023-01-18 ENCOUNTER — OFFICE VISIT (OUTPATIENT)
Dept: HEMATOLOGY/ONCOLOGY | Facility: CLINIC | Age: 28
End: 2023-01-18
Payer: COMMERCIAL

## 2023-01-18 VITALS
RESPIRATION RATE: 15 BRPM | SYSTOLIC BLOOD PRESSURE: 111 MMHG | OXYGEN SATURATION: 100 % | HEIGHT: 65 IN | HEART RATE: 86 BPM | WEIGHT: 142 LBS | DIASTOLIC BLOOD PRESSURE: 78 MMHG | TEMPERATURE: 98 F | BODY MASS INDEX: 23.66 KG/M2

## 2023-01-18 DIAGNOSIS — D64.9 ANEMIA, UNSPECIFIED TYPE: Primary | ICD-10-CM

## 2023-01-18 DIAGNOSIS — D64.9 ANEMIA, UNSPECIFIED TYPE: ICD-10-CM

## 2023-01-18 DIAGNOSIS — D50.0 IRON DEFICIENCY ANEMIA DUE TO CHRONIC BLOOD LOSS: ICD-10-CM

## 2023-01-18 LAB
% SATURATION: 7 % (ref 20–50)
ANISOCYTOSIS: NORMAL
BASOPHILS NFR BLD: 0.5 % (ref 0–3)
EOSINOPHIL NFR BLD: 1.9 % (ref 1–3)
ERYTHROCYTE [DISTWIDTH] IN BLOOD BY AUTOMATED COUNT: 19.1 % (ref 12.5–18)
FERRITIN SERPL-MCNC: 86 NG/ML (ref 8–388)
HCT VFR BLD AUTO: 29.5 % (ref 37–47)
HGB BLD-MCNC: 8.2 G/DL (ref 12–16)
HYPOCHROMIA BLD QL SMEAR: NORMAL
IRON: 16 UG/DL (ref 26–170)
LYMPHOCYTES NFR BLD: 18 % (ref 25–40)
MCH RBC QN AUTO: 18.7 PG (ref 27–31.2)
MCHC RBC AUTO-ENTMCNC: 27.8 G/DL (ref 31.8–35.4)
MCV RBC AUTO: 67.2 FL (ref 80–97)
MICROCYTES BLD QL SMEAR: NORMAL
MONOCYTES NFR BLD: 9.6 % (ref 1–15)
NEUTROPHILS # BLD AUTO: 6.08 10*3/UL (ref 1.8–7.7)
NEUTROPHILS NFR BLD: 69.5 % (ref 37–80)
NUCLEATED RED BLOOD CELLS: 0 %
PLATELETS: 605 10*3/UL (ref 142–424)
RBC # BLD AUTO: 4.39 10*6/UL (ref 4.2–5.4)
SMALL PLATELETS BLD QL SMEAR: NORMAL
TOTAL IRON BINDING CAPACITY: 224 UG/DL (ref 250–450)
WBC # BLD: 8.7 10*3/UL (ref 4.6–10.2)

## 2023-01-18 PROCEDURE — 99214 PR OFFICE/OUTPT VISIT, EST, LEVL IV, 30-39 MIN: ICD-10-PCS | Mod: S$GLB,,, | Performed by: INTERNAL MEDICINE

## 2023-01-18 PROCEDURE — 99214 OFFICE O/P EST MOD 30 MIN: CPT | Mod: S$GLB,,, | Performed by: INTERNAL MEDICINE

## 2023-01-18 RX ORDER — SODIUM CHLORIDE 0.9 % (FLUSH) 0.9 %
10 SYRINGE (ML) INJECTION
OUTPATIENT
Start: 2023-01-26

## 2023-01-18 RX ORDER — HEPARIN 100 UNIT/ML
500 SYRINGE INTRAVENOUS
OUTPATIENT
Start: 2023-01-26

## 2023-01-18 RX ORDER — SODIUM CHLORIDE 0.9 % (FLUSH) 0.9 %
10 SYRINGE (ML) INJECTION
OUTPATIENT
Start: 2023-02-02

## 2023-01-18 RX ORDER — HEPARIN 100 UNIT/ML
500 SYRINGE INTRAVENOUS
OUTPATIENT
Start: 2023-02-02

## 2023-01-18 NOTE — PROGRESS NOTES
HEMATOLOGY FOLLOW UP CONSULTATION NOTE    Patient ID: Jhoana Yañez is a 27 y.o. female.    Chief Complaint: Anemia    HPI:  Patient is a 26-year-old female  who recently underwent a diagnostic Laparo scopy for removal of endometriomas and cyst drainage by OBGYN service.  She was noted to be severely anemic with hemoglobin of 7.5 an MCV of 63.4.  She was referred to us for further evaluation and presents to our clinic.  Reports feeling tired and fatigue all the time.  Denies any history of sickle cell or thalassemias in the family.          Pathology:    OVARY, RIGHT CYST, EXCISION:   - CHRONICALLY INFLAMED BENIGN SEROUS CYSTADENOMA          Past Medical History:   Diagnosis Date    Anemia     Endometriosis        Family History   Problem Relation Age of Onset    Hypertension Mother        Social History     Socioeconomic History    Marital status:    Tobacco Use    Smoking status: Never    Smokeless tobacco: Never   Substance and Sexual Activity    Alcohol use: Never    Drug use: Never    Sexual activity: Yes     Partners: Male     Birth control/protection: None     Comment: TTC         Past Surgical History:   Procedure Laterality Date    DIAGNOSTIC LAPAROSCOPY      Removal of endometriomas       Review of systems:  Review of Systems   Constitutional:  Positive for activity change and fatigue. Negative for appetite change, chills, diaphoresis and unexpected weight change.   HENT:  Negative for congestion, facial swelling, hearing loss, mouth sores, trouble swallowing and voice change.    Eyes:  Negative for photophobia, pain, discharge and itching.   Respiratory:  Negative for apnea, cough, choking, chest tightness and shortness of breath.    Cardiovascular:  Negative for chest pain, palpitations and leg swelling.   Gastrointestinal:  Negative for abdominal distention, abdominal pain, anal bleeding and blood in stool.   Endocrine: Negative for cold intolerance, heat intolerance,  polydipsia and polyphagia.   Genitourinary:  Positive for menstrual problem and vaginal bleeding. Negative for difficulty urinating, dysuria, flank pain and hematuria.        +ve for abdominal pain and recent laparoscopy. See HPI   Musculoskeletal:  Negative for arthralgias, back pain, joint swelling, myalgias, neck pain and neck stiffness.   Skin:  Negative for color change, pallor and wound.   Allergic/Immunologic: Negative for environmental allergies, food allergies and immunocompromised state.   Neurological:  Negative for dizziness, seizures, facial asymmetry, speech difficulty, light-headedness, numbness and headaches.   Hematological:  Negative for adenopathy. Does not bruise/bleed easily.   Psychiatric/Behavioral:  Negative for agitation, behavioral problems, confusion, decreased concentration and sleep disturbance.              Physical Exam  Vitals and nursing note reviewed.   Constitutional:       General: She is not in acute distress.     Appearance: Normal appearance. She is not ill-appearing.      Comments: +ve for pallor   HENT:      Head: Normocephalic and atraumatic.      Nose: No congestion or rhinorrhea.   Eyes:      General: No scleral icterus.     Extraocular Movements: Extraocular movements intact.      Pupils: Pupils are equal, round, and reactive to light.   Cardiovascular:      Rate and Rhythm: Normal rate and regular rhythm.      Pulses: Normal pulses.      Heart sounds: Normal heart sounds. No murmur heard.    No gallop.   Pulmonary:      Effort: Pulmonary effort is normal. No respiratory distress.      Breath sounds: Normal breath sounds. No stridor. No wheezing or rhonchi.   Abdominal:      General: Bowel sounds are normal. There is no distension.      Palpations: There is no mass.      Tenderness: There is no abdominal tenderness. There is no guarding.   Musculoskeletal:         General: No swelling, tenderness, deformity or signs of injury. Normal range of motion.      Cervical back:  Normal range of motion and neck supple. No rigidity. No muscular tenderness.      Right lower leg: No edema.      Left lower leg: No edema.   Skin:     General: Skin is warm.      Coloration: Skin is not jaundiced or pale.      Findings: No bruising or lesion.   Neurological:      General: No focal deficit present.      Mental Status: She is alert and oriented to person, place, and time.      Cranial Nerves: No cranial nerve deficit.      Sensory: No sensory deficit.      Motor: No weakness.      Gait: Gait normal.   Psychiatric:         Mood and Affect: Mood normal.         Behavior: Behavior normal.         Thought Content: Thought content normal.     Vitals:    01/18/23 0809   BP: 111/78   Pulse: 86   Resp: 15   Temp: 98.1 °F (36.7 °C)   Body surface area is 1.72 meters squared.    Assessment/Plan:       1. Severe Iron deficiency Anemia:    == Secondary to endometriomas and menorrhagia  == Prior labs done by OBGYN service where she was noted to have a hemoglobin of 7.5 with hematocrit of 28.4 with extremely microcytic, hypochromic anemia.  She was also noted to have anisocytosis 1+ and elevated platelet count at 490 which is likely physiological bone marrow response to anemia.   ==05/19/2022: Patient with symptomatic iron deficiency anemia. She has tried oral iron which caused nausea and vomiting. She has also tried liquid po iron in past which she also did not tolerate. She reports continued severe fatigue and cold intolerance. IV iron was denied by insurance previously. Will resubmit as patient is increasingly symptomatic and iron dropped from 18 at previous visit to 14 drawn on 5/19/2022. Iron saturation also dropped from 4 to 3. As she is not able to tolerate po iron and continues to have decrease she will likely need blood transfusion in future if IV iron is not approved. Educated on ER precautions if heart palpitations, dyspnea or worsening of symptoms.  == 1/18/23: Still unable to get IV iron due to  Insurance issues. She has recently changed insurances and now presents for f/up. Continues to have problems with menorrhagia but has not seen Gyn since the last year. I will obtain iron profile and send PA request for IV iron.  In the interim I would recommend she starts on p.o. iron supplementation and take vitamin-C to enhance absorption.      Plan:  PA for IV iron    Total time spent in counseling and discussion about further management options including relevant lab work, treatment,  prognosis, medications and intended side effects was more than 25 minutes. More than 50 % of the time was spent in counseling and coordination of care.  I spent a total of 25 minutes on the day of the visit.This includes face to face time and non-face to face time preparing to see the patient (eg, review of tests), Obtaining and/or reviewing separately obtained history, Documenting clinical information in the electronic or other health record, Independently interpreting resultsand communicating results to the patient/family/caregiver, or Care coordination.

## 2023-01-26 ENCOUNTER — PATIENT MESSAGE (OUTPATIENT)
Dept: HEMATOLOGY/ONCOLOGY | Facility: CLINIC | Age: 28
End: 2023-01-26
Payer: COMMERCIAL

## 2023-02-01 ENCOUNTER — PATIENT MESSAGE (OUTPATIENT)
Dept: HEMATOLOGY/ONCOLOGY | Facility: CLINIC | Age: 28
End: 2023-02-01
Payer: COMMERCIAL

## 2023-02-03 ENCOUNTER — TELEPHONE (OUTPATIENT)
Dept: HEMATOLOGY/ONCOLOGY | Facility: CLINIC | Age: 28
End: 2023-02-03
Payer: COMMERCIAL

## 2023-02-03 NOTE — TELEPHONE ENCOUNTER
Faxed records to Dr Sams per patient request      ----- Message from Ronda Drummond sent at 2/3/2023  3:44 PM CST -----  Contact: self  Needing her most recent blood work/labs/any imaging or other medical records sent to Dr Gaston Sams's office Fax number is [phone].     Please call back at [phone] if there are any issues or questions with this request.

## 2023-02-16 ENCOUNTER — PATIENT MESSAGE (OUTPATIENT)
Dept: PRIMARY CARE CLINIC | Facility: CLINIC | Age: 28
End: 2023-02-16

## 2023-02-16 ENCOUNTER — OFFICE VISIT (OUTPATIENT)
Dept: PRIMARY CARE CLINIC | Facility: CLINIC | Age: 28
End: 2023-02-16
Payer: COMMERCIAL

## 2023-02-16 VITALS
WEIGHT: 138 LBS | HEART RATE: 86 BPM | SYSTOLIC BLOOD PRESSURE: 112 MMHG | OXYGEN SATURATION: 100 % | BODY MASS INDEX: 22.99 KG/M2 | DIASTOLIC BLOOD PRESSURE: 81 MMHG | HEIGHT: 65 IN

## 2023-02-16 DIAGNOSIS — R10.9 ABDOMINAL PAIN, UNSPECIFIED ABDOMINAL LOCATION: Primary | ICD-10-CM

## 2023-02-16 DIAGNOSIS — K62.5 RECTAL BLEEDING: ICD-10-CM

## 2023-02-16 DIAGNOSIS — R19.00 PELVIC MASS IN FEMALE: ICD-10-CM

## 2023-02-16 PROCEDURE — 99203 OFFICE O/P NEW LOW 30 MIN: CPT | Mod: S$GLB,,, | Performed by: INTERNAL MEDICINE

## 2023-02-16 PROCEDURE — 99203 PR OFFICE/OUTPT VISIT, NEW, LEVL III, 30-44 MIN: ICD-10-PCS | Mod: S$GLB,,, | Performed by: INTERNAL MEDICINE

## 2023-02-16 RX ORDER — DICYCLOMINE HYDROCHLORIDE 20 MG/1
20 TABLET ORAL 3 TIMES DAILY PRN
Qty: 90 TABLET | Refills: 3 | Status: SHIPPED | OUTPATIENT
Start: 2023-02-16 | End: 2023-03-18

## 2023-02-16 RX ORDER — DICYCLOMINE HYDROCHLORIDE 20 MG/1
20 TABLET ORAL 3 TIMES DAILY
COMMUNITY
Start: 2023-02-09 | End: 2023-02-16 | Stop reason: SDUPTHER

## 2023-02-16 NOTE — PROGRESS NOTES
Subjective:      Patient ID: Jhoana Yañez is a 27 y.o. female.    Chief Complaint: Establish Care (See's Dr Gaston Sams at [facility]), Follow-up ([facility] ER ON 23. ), and Referral (GI- Dr Miller; )    HPI      Past Medical History:   Diagnosis Date    Anemia     Endometriosis      Past Surgical History:   Procedure Laterality Date    DIAGNOSTIC LAPAROSCOPY      Removal of endometriomas x2    WISDOM TOOTH EXTRACTION       Family History   Problem Relation Age of Onset    Hypertension Mother     No Known Problems Father        Social History     Socioeconomic History    Marital status:    Tobacco Use    Smoking status: Never    Smokeless tobacco: Never   Substance and Sexual Activity    Alcohol use: Never    Drug use: Never    Sexual activity: Yes     Partners: Male     Birth control/protection: None     Comment: TTC       Patient is a 27-year-old female  who  underwent a diagnostic Laparo scopy for removal of endometriomas and cyst drainage by OBGYN service.  She was noted to be severely anemic with hemoglobin of 7.5 an MCV of 63.4.  She was referred to hematology for further evaluation   Reports feeling tired and fatigue all the time.  Denies any history of sickle cell or thalassemias in the family.    She last saw Dr Staley one year ago and states due to insurance issues she is switching hematologist and Gynecologist to [facility]     She was unable to get IV iron and states she cannot tolerate oral iron        Patient here to establish care and is in need of referrals. She was recently seen in the ER at [facility] and was noted to be anemic, was given blood transfusion and an abdomen/CT scan was done which showed a pelvic mass    She also reports rectal bleeding which started recently and reports abdominal pain all over. Not currently on birth control      Review of Systems   Constitutional:  Positive for malaise/fatigue. Negative for chills and fever.   Eyes:  Negative for blurred vision.  "  Respiratory:  Negative for cough, shortness of breath and wheezing.    Cardiovascular:  Negative for chest pain, palpitations and leg swelling.   Gastrointestinal:  Positive for abdominal pain and blood in stool.   Genitourinary:  Negative for dysuria, frequency and urgency.   Musculoskeletal:  Negative for falls.   Skin:  Negative for rash.   Neurological:  Negative for dizziness and headaches.   Objective:     Physical Exam  Vitals reviewed.   Constitutional:       Appearance: Normal appearance.   HENT:      Head: Normocephalic.      Mouth/Throat:      Mouth: Mucous membranes are moist.      Pharynx: Oropharynx is clear.   Eyes:      Extraocular Movements: Extraocular movements intact.      Conjunctiva/sclera: Conjunctivae normal.      Pupils: Pupils are equal, round, and reactive to light.   Cardiovascular:      Rate and Rhythm: Normal rate and regular rhythm.   Pulmonary:      Effort: Pulmonary effort is normal.      Breath sounds: Normal breath sounds.   Abdominal:      General: Bowel sounds are normal. There is no distension.      Tenderness: There is no abdominal tenderness. There is no guarding.   Musculoskeletal:      Right lower leg: No edema.      Left lower leg: No edema.   Lymphadenopathy:      Cervical: No cervical adenopathy.   Skin:     General: Skin is warm.      Capillary Refill: Capillary refill takes less than 2 seconds.   Neurological:      General: No focal deficit present.      Mental Status: She is alert and oriented to person, place, and time.   Psychiatric:         Mood and Affect: Mood normal.     /81 (BP Location: Left arm, Patient Position: Sitting, BP Method: Medium (Automatic))   Pulse 86   Ht 5' 5" (1.651 m)   Wt 62.6 kg (138 lb)   LMP 02/07/2023   SpO2 100%   BMI 22.96 kg/m²     Assessment:       ICD-10-CM ICD-9-CM   1. Abdominal pain, unspecified abdominal location  R10.9 789.00   2. Pelvic mass in female  R19.00 789.30   3. Rectal bleeding  K62.5 569.3       Plan: "     Medication List with Changes/Refills   Changed and/or Refilled Medications    Modified Medication Previous Medication    DICYCLOMINE (BENTYL) 20 MG TABLET dicyclomine (BENTYL) 20 mg tablet       Take 1 tablet (20 mg total) by mouth 3 (three) times daily as needed (abdominal pain).    Take 20 mg by mouth 3 (three) times daily.   Discontinued Medications    DROSPIRENONE-ETHINYL ESTRADIOL (LINDA) 3-0.02 MG PER TABLET    Take 1 tablet by mouth once daily.    FERROUS SULFATE 325 (65 FE) MG EC TABLET    Take 1 tablet (325 mg total) by mouth 2 (two) times daily.    NORGESTIMATE-ETHINYL ESTRADIOL (ORTHO TRI-CYCLEN,TRI-SPRINTEC) 0.18/0.215/0.25 MG-35 MCG (28) TABLET    Take 1 tablet by mouth once daily.        1. Abdominal pain, unspecified abdominal location  -     dicyclomine (BENTYL) 20 mg tablet; Take 1 tablet (20 mg total) by mouth 3 (three) times daily as needed (abdominal pain).  Dispense: 90 tablet; Refill: 3    2. Pelvic mass in female  -     Ambulatory referral/consult to Obstetrics / Gynecology; Future; Expected date: 02/23/2023    3. Rectal bleeding  -     Ambulatory referral/consult to Gastroenterology; Future; Expected date: 02/23/2023         Appointment with Hematology on the 27th of feb, referrals to be faxed as urgent    RTC sooner if needed    Future Appointments   Date Time Provider Department Center   8/16/2023  8:20 AM Flora Urbina MD [facility]

## 2023-03-22 ENCOUNTER — TELEPHONE (OUTPATIENT)
Dept: PRIMARY CARE CLINIC | Facility: CLINIC | Age: 28
End: 2023-03-22
Payer: COMMERCIAL

## 2023-03-22 NOTE — TELEPHONE ENCOUNTER
Given Dr Urbina's number for Dr Mello to call her personally.     ----- Message from Tamy Hoffmann sent at 3/22/2023  1:47 PM CDT -----  Contact: Cyn/Dr. Riaz Addison needs a call back at [phone], Regards to the labs that there office did.    Thanks  Td

## 2023-03-30 ENCOUNTER — TELEPHONE (OUTPATIENT)
Dept: OBSTETRICS AND GYNECOLOGY | Facility: CLINIC | Age: 28
End: 2023-03-30
Payer: COMMERCIAL

## 2023-03-30 NOTE — TELEPHONE ENCOUNTER
Contacted the patient to schedule endometriosis consult with Dr. Hancock. Patient stated that she will need to find out if Dr. Hancock is in her network. Patient informed of [facility]'s billing number to confirm her coverage and benefits.  Patient tentatively scheduled for consult on 4/6 at 1pm at the [place] office. Patient verbalized understanding.

## 2023-03-31 ENCOUNTER — PATIENT MESSAGE (OUTPATIENT)
Dept: FAMILY MEDICINE | Facility: CLINIC | Age: 28
End: 2023-03-31
Payer: COMMERCIAL

## 2023-07-05 ENCOUNTER — PATIENT MESSAGE (OUTPATIENT)
Dept: RESEARCH | Facility: HOSPITAL | Age: 28
End: 2023-07-05
Payer: COMMERCIAL

## 2023-07-11 ENCOUNTER — PATIENT MESSAGE (OUTPATIENT)
Dept: RESEARCH | Facility: HOSPITAL | Age: 28
End: 2023-07-11
Payer: COMMERCIAL

## 2023-07-13 ENCOUNTER — TELEPHONE (OUTPATIENT)
Dept: PRIMARY CARE CLINIC | Facility: CLINIC | Age: 28
End: 2023-07-13

## 2023-07-13 ENCOUNTER — OFFICE VISIT (OUTPATIENT)
Dept: PRIMARY CARE CLINIC | Facility: CLINIC | Age: 28
End: 2023-07-13
Payer: COMMERCIAL

## 2023-07-13 VITALS
OXYGEN SATURATION: 99 % | DIASTOLIC BLOOD PRESSURE: 77 MMHG | SYSTOLIC BLOOD PRESSURE: 102 MMHG | HEIGHT: 65 IN | BODY MASS INDEX: 21.46 KG/M2 | WEIGHT: 128.81 LBS | HEART RATE: 70 BPM

## 2023-07-13 DIAGNOSIS — N80.9 ENDOMETRIOSIS DETERMINED BY LAPAROSCOPY: ICD-10-CM

## 2023-07-13 DIAGNOSIS — R30.0 DYSURIA: Primary | ICD-10-CM

## 2023-07-13 DIAGNOSIS — R10.9 ABDOMINAL PAIN, UNSPECIFIED ABDOMINAL LOCATION: ICD-10-CM

## 2023-07-13 DIAGNOSIS — Z01.818 PRE-OP EXAM: ICD-10-CM

## 2023-07-13 LAB
APPEARANCE, UA: ABNORMAL
BACTERIA SPEC CULT: ABNORMAL /HPF
BILIRUB UR QL STRIP: NEGATIVE
COLOR UR: YELLOW
GLUCOSE (UA): NEGATIVE MG/DL
HGB UR QL STRIP: ABNORMAL
KETONES UR QL STRIP: NEGATIVE MG/DL
LEUKOCYTE ESTERASE UR QL STRIP: 500 LEU/UL
MUCUS URINE: ABNORMAL /LPF
NITRITE UR QL STRIP: NEGATIVE
PH UR STRIP: 6 PH (ref 5–8)
PROT UR QL STRIP: 50 MG/DL
RBC #/AREA URNS HPF: ABNORMAL /HPF (ref 0–2)
SERVICE COMMENT 03: ABNORMAL
SP GR UR STRIP: 1.01 (ref 1–1.03)
SQUAMOUS EPITHELIAL, UA: ABNORMAL /LPF
UROBILINOGEN UR STRIP-ACNC: NORMAL MG/DL
WBC #/AREA URNS HPF: ABNORMAL /HPF (ref 0–2)
WBC CLUMP: ABNORMAL /HPF

## 2023-07-13 PROCEDURE — 99214 PR OFFICE/OUTPT VISIT, EST, LEVL IV, 30-39 MIN: ICD-10-PCS | Mod: S$GLB,,, | Performed by: INTERNAL MEDICINE

## 2023-07-13 PROCEDURE — 99214 OFFICE O/P EST MOD 30 MIN: CPT | Mod: S$GLB,,, | Performed by: INTERNAL MEDICINE

## 2023-07-13 NOTE — PROGRESS NOTES
Subjective:      Patient ID: Jhoana Yañez is a 27 y.o. female.    Chief Complaint: Pre-op Exam and Urinary Tract Infection (She did go to the ER on 07/05/23 and took her last antibiotic on the 8th. She states she cloudy and odor and blood that is not bright red.  )    HPI    Past Medical History:   Diagnosis Date    Anemia     Cyst of ovary, left 11/26/2018    Cyst of ovary, right 05/12/2020    Endometriosis     stage 4    Osteochondroma     LEFT DISTAL MEDIAL FEMUR-DR HALE     Patient here for pre op for robotic surgery for her endometriosis. She had all her labs, ekg etc completed, she was positive for a UTI and was given keflex     Gynecologist is Dr Schmitt    Review of Systems   Constitutional:  Negative for chills and fever.   HENT:  Negative for hearing loss.    Eyes:  Negative for blurred vision.   Respiratory:  Negative for cough, shortness of breath and wheezing.    Cardiovascular:  Negative for chest pain, palpitations and leg swelling.   Gastrointestinal:  Negative for abdominal pain, blood in stool, constipation, diarrhea, melena, nausea and vomiting.   Genitourinary:  Negative for dysuria, frequency and urgency.   Musculoskeletal:  Negative for falls.   Skin:  Negative for rash.   Neurological:  Negative for dizziness and headaches.   Endo/Heme/Allergies:  Does not bruise/bleed easily.   Psychiatric/Behavioral:  Negative for depression. The patient is not nervous/anxious.    Objective:     Physical Exam  Vitals reviewed.   Constitutional:       Appearance: Normal appearance.   HENT:      Head: Normocephalic.      Mouth/Throat:      Mouth: Mucous membranes are moist.      Pharynx: Oropharynx is clear.   Eyes:      Extraocular Movements: Extraocular movements intact.      Conjunctiva/sclera: Conjunctivae normal.      Pupils: Pupils are equal, round, and reactive to light.   Cardiovascular:      Rate and Rhythm: Normal rate and regular rhythm.   Pulmonary:      Effort: Pulmonary effort is normal.  "     Breath sounds: Normal breath sounds.   Abdominal:      General: Bowel sounds are normal.   Musculoskeletal:      Right lower leg: No edema.      Left lower leg: No edema.   Skin:     General: Skin is warm.      Capillary Refill: Capillary refill takes less than 2 seconds.   Neurological:      General: No focal deficit present.      Mental Status: She is alert.   Psychiatric:         Mood and Affect: Mood normal.     /77 (BP Location: Right arm, Patient Position: Sitting, BP Method: Medium (Automatic))   Pulse 70   Ht 5' 5" (1.651 m)   Wt 58.4 kg (128 lb 12.8 oz)   LMP 06/29/2023 (Exact Date)   SpO2 99%   BMI 21.43 kg/m²     Assessment:       ICD-10-CM ICD-9-CM   1. Dysuria  R30.0 788.1   2. Pre-op exam  Z01.818 V72.84   3. Abdominal pain, unspecified abdominal location  R10.9 789.00   4. Endometriosis determined by laparoscopy  N80.9 617.9       Plan:          1. Dysuria  -     Urinalysis, Reflex to Urine Culture Urine, Clean Catch; Future; Expected date: 07/13/2023  -     C. trachomatis/N. gonorrhoeae by AMP DNA [facility]; Urine  -     Urinalysis, Reflex to Urine Culture Urine, Clean Catch; Future; Expected date: 07/17/2023  -     Discontinue: nitrofurantoin, macrocrystal-monohydrate, (MACROBID) 100 MG capsule; Take 1 capsule (100 mg total) by mouth 2 (two) times daily. for 5 days  Dispense: 10 capsule; Refill: 0    2. Pre-op exam  -     EKG 12-lead; Future    3. Abdominal pain, unspecified abdominal location    4. Endometriosis determined by laparoscopy         Repeat UA shows no growth      Patient low risk for low to medium risk surgery       Future Appointments   Date Time Provider Department Center   8/16/2023  8:20 AM Flora Urbina MD [facility] [id number] [facility] JO [facility]                        "

## 2023-07-13 NOTE — TELEPHONE ENCOUNTER
The patient advised that even though Dr Urbina is covered, the hospital is not covered. She went to [facility] for the labs and getting ready for the EKG.       ----- Message from Ronda Drummond sent at 7/13/2023  8:29 AM CDT -----  Contact: self  She's trying to get pre-op taken care of, but the hospital she went to does not take her insurance. Wanting to know if she can go to another hospital. Please call back at [phone].

## 2023-07-15 LAB
APTIMA MEDIA TYPE: NORMAL
C. TRACHOMATIS DNA PROBE RESULT:: NEGATIVE
N. GONORRHOEAE DNA PROBE RESULT:: NEGATIVE
SPECIMEN SOURCE: NORMAL

## 2023-07-17 DIAGNOSIS — R30.0 DYSURIA: ICD-10-CM

## 2023-07-17 RX ORDER — NITROFURANTOIN 25; 75 MG/1; MG/1
100 CAPSULE ORAL 2 TIMES DAILY
Qty: 10 CAPSULE | Refills: 0 | Status: SHIPPED | OUTPATIENT
Start: 2023-07-17 | End: 2023-07-17 | Stop reason: SDUPTHER

## 2023-07-18 RX ORDER — NITROFURANTOIN 25; 75 MG/1; MG/1
100 CAPSULE ORAL 2 TIMES DAILY
Qty: 10 CAPSULE | Refills: 0 | Status: SHIPPED | OUTPATIENT
Start: 2023-07-18 | End: 2023-07-23

## 2023-07-24 ENCOUNTER — PATIENT MESSAGE (OUTPATIENT)
Dept: PRIMARY CARE CLINIC | Facility: CLINIC | Age: 28
End: 2023-07-24
Payer: COMMERCIAL

## 2023-07-26 ENCOUNTER — TELEPHONE (OUTPATIENT)
Dept: PRIMARY CARE CLINIC | Facility: CLINIC | Age: 28
End: 2023-07-26
Payer: COMMERCIAL

## 2023-07-26 ENCOUNTER — PATIENT MESSAGE (OUTPATIENT)
Dept: PRIMARY CARE CLINIC | Facility: CLINIC | Age: 28
End: 2023-07-26
Payer: COMMERCIAL

## 2023-07-26 NOTE — TELEPHONE ENCOUNTER
----- Message from Ronda Drummond sent at 7/26/2023  1:56 PM CDT -----  Contact: self  Requesting a call back regarding the status of her results - still having bleeding when urinating. Please call back at [phone]

## 2023-07-27 ENCOUNTER — TELEPHONE (OUTPATIENT)
Dept: GYNECOLOGIC ONCOLOGY | Facility: CLINIC | Age: 28
End: 2023-07-27
Payer: COMMERCIAL

## 2023-07-27 NOTE — NURSING
New referral received from dr Lazaro Cobian for diagnosis of pelvic mass and elevated . Pt called and name and  verified. Explained my role as nurse navigator and direct number provided. Pt scheduled to see Dr Ayala in the soonest available [place] appointment. Patient encouraged to call for any questions or concerns about her care or appointments. Pt verbalized understanding. Date time and location of appointment reviewed with pt.     Oncology Navigation   Intake  Cancer Type: Gynecologic (pelvic mass and elvated , history of endometriosis)  Internal / External Referral: External (Dr Lazaro Cobian- [facility])  Date of Referral: 23  Initial Nurse Navigator Contact: 23  Referral to Initial Contact Timeline (days): 6  Date Worked: 23  First Appointment Available: 23  Appointment Date: 23  First Available Date vs. Scheduled Date (days): 8  Reason if booked > 7 days after scheduling: Patient request; Specific provider / access     Treatment  Current Status: Staging work-up    Surgery: -- (DIAGNOSTIC LAPAROSCOPY- Removal of endometriomas)  Type of Surgery: dx scope and cyst drainage, path=Serous cystadenoma (done by Dr Luís CAPONE)  Surgery Schedule Date: 22          Procedures: Other; MRI; CT; Ultrasound  CT Schedule Date: 23 (large 11x9cm noncalcified septated solid/cystic pelvic mass of indeterminate etiology)  MRI Schedule Date: 23 (large multisystic mass within the central pelvis with nodular enhancing septations and irregular wall thickening. Invasion of the urinary bladder)  Ultrasound Schedule Date: 23 (large complex pelvic mass noted above the urterus)  Other Schedule Date: 23 (= 94.7)         Acuity      Follow Up  No follow-ups on file.

## 2023-07-31 DIAGNOSIS — R31.9 HEMATURIA, UNSPECIFIED TYPE: Primary | ICD-10-CM

## 2023-07-31 NOTE — PROGRESS NOTES
REFERRING PROVIDER  Dr. Lazaro Covarrubias    HISTORY OF PRESENT CONDITION  CC: Pelvic mass, elevated CA-125.     Jhoana Yañez is a 27 y.o.  with known stage IV endometriosis and past endometriomas presents today for complex pelvic mass and elevated CA-125. In 2023, she presented to the ED with abdominal pain and blood in stool. Work-up is detailed below.  Work-up of hematochezia revealed hemorrhoids. Patient also give history of hematuria.    Of note, Patient has a long-standing history of endometriosis w/ 2 prior surgeries (ovarian cystectomy, resection of endometriomas, partial ovarian resection (2/3 of R ovary). Most recently in , (Dr. Staley) patient underwent a Robotic laparoscopic excision of R ovarian serous cycstadenoma. She also has recent history of rectal bleeding with EDG/Colonscopy positive for hemorrhoids. Fe deficiency anemia 2/2 to endometriomas and menorrhagia (intolerant to PO iron, IV iron not covered)     Dr. Cherry (OBGYN in [place]) performed cystectomy in   Dr. Regi Covarrubias (OBGYN in [place])   Dr. Staley ([place]GY[place])   Dr. Angela Hager (GI)   Dr Gaston Sams and Tobi (hematology at [facility])    Data Reviewed:   2020 Ex Lap, Partial bilateral oophorectomies (Dr. Irving)  Findngs: 10 cm right ovarian endometrioma and stage 4 endometriosis with involvement of fallopian tubes     22 diagnostic scope, cyst drainage, EARLINE.   Findings: six weak size uterus. Ovaries adheres to the midline, posterior cul-de-sac obliterated, anterior cul-de-sac obliterated, unable to identify the tubes, sigmoid adheres to the ovary and back of uterus. Blunt dissection was used to free up the anterior cul-de-sac. We attempted to also do the same on the posterior cul-de-sac, but this was not possible. The mass was likely both of her adnexa enlarged and adhesed to the Medline. It was unclear which side the mass was coming from. So at this point,  we made an incision in the mass and drained two different chocolate cyst. Pathology: Chronically inflamed benign serous cystadenoma.     02/09/23 TVUS: Uterus is anti-flexed 7.9 x 4.3 x 4.3 cm. Large complex mass as noted on CT. Right ovary 3.3 x 3 x 2.8 cm. It contains a small simple cyst. Left ovary 3.4 x 2.1 x 2.6 multiple follicles are appreciated. There is no adnexal free fluid in cul-de-sac.     02/09/23 CT:  Imaging through the pelvis, demonstrates a large septated complex mass measuring 11 x 9 cm in the axial plane and 14 cm from superior to inferior. It is contiguous with the anti-flexed uterus. Ovaries are not visualized with confidence. There are no abnormal calcifications. There is a small amount of pelvic free fluid anteriorly. Urinary bladder is compressed and contracted. Rectum and sigmoid colon showed no significant abnormality. Impression: large noncalcified, septated, solid cystic, pelvic mass of indeterminate etiology. Benign entities, such as endometrioma, complex, ovarian cyst, and less likely Jose myoma. Malignancy such as ovarian cancer cannot be excluded. No intra-abdominal or retroperitoneal lymphadenopathy. No significant solid organ abnormality.     06/13/23 MRI pelvis:  10.9 x 8.2 x 10.7 cm multi cystic mass within the central pelvis. The mass has nodular enhancing septations and irregular wall thickening. There is invasion of the superior wall of the urinary bladder. The bowel is displaced into the periphery of the abdomen and into the upper abdomen. Small free fluid. Bilateral Bartholin gland cysts measuring 1.4 cm on the right side .8 cm on the left side. The uterus is anteverted and anti-flex measuring 8.3 cm AP by 5 cm craniocaudal by 6.2 cm transverse. Neither ovary well evaluated on the current exam. No abnormal signal within the visualized osseous structures. Impression: large multi cystic mass with the central pelvis with nodular enhancing septations and irregular wall thickening.  Invasion of the urinary bladder. Etiologies would include benign and malignant neoplasm, including endometriosis and cystic ovarian neoplasm. The findings are not significantly changed when compared to Prior examination on 02/09/2023.     07/13/23 CA-125: 94.7    PSH: Laparoscopic resection of endometriosis, EGD, colonoscopy  Family History:    Past Medical History:   Diagnosis Date    Anemia     Cyst of ovary, left 11/26/2018    Cyst of ovary, right 05/12/2020    Endometriosis     stage 4    Osteochondroma     LEFT DISTAL MEDIAL FEMUR-DR HALE      No current outpatient medications on file.     No current facility-administered medications for this visit.     Review of patient's allergies indicates:  No Known Allergies    Past Surgical History:   Procedure Laterality Date    BREAST BIOPSY Left 10/23/2013    benign    DIAGNOSTIC LAPAROSCOPY Bilateral 06/11/2020    10 cm ovarian endometriosis  and small left    LAPAROTOMY  01/19/2022    for stage 4 endometriosis    WISDOM TOOTH EXTRACTION          FAMILY HISTORY  Family History   Problem Relation Age of Onset    Hypertension Mother     ADD / ADHD Mother     No Known Problems Father     Heart disease Maternal Grandmother     Arthritis Maternal Grandfather     Lung cancer Maternal Grandfather        SOCIAL HISTORY    reports that she has never smoked. She has never used smokeless tobacco. She reports that she does not drink alcohol and does not use drugs.    OBJECTIVE   Vitals:    08/08/23 0920   BP: 110/74   Pulse: 84   Resp: 18      Body mass index is 21.72 kg/m².     Physical Exam:   Constitutional: She is oriented to person, place, and time. She appears well-developed and well-nourished.    HENT:   Head: Normocephalic and atraumatic.    Eyes: Conjunctivae and EOM are normal. No scleral icterus.      Pulmonary/Chest: Effort normal and breath sounds normal. No respiratory distress.                  Musculoskeletal: Moves all extremeties.       Neurological: She is  alert and oriented to person, place, and time.    Skin: No rash noted. No erythema.    Psychiatric: She has a normal mood and affect. Her behavior is normal. Judgment and thought content normal.     ECOG status: 0    LABORATORY DATA  Lab data reviewed.    RADIOLOGICAL DATA  Radiology data reviewed.    PATHOLOGY DATA  Pathology data reviewed.    ASSESSMENT    1. Endometriosis determined by laparoscopy    2. Pelvic mass    Patient has know history of endometriosis and endometriomas.  Her current mass is likely a recurrent endometrioma and her elevated  is consistent with that diagnosis.  Her MRI is concerning for invasion in the bladder.  All of the above, inclusive of her prior operative reports, portends a complicated surgery.  Despite the above, I think the risk of malignancy is low.     I discussed with her that I am a subspecialty trained surgeon experienced in operating in challenging settings.  My practice is focused on cancer and not endometriosis.  I would base the extent of my surgery ultimately on her wishes and also her safety, though there is a high likelihood of needing an open surgery with concomitant bowel and bladder surgery.  I discussed that if definitive surgery were not done, each subsequent surgery would be more complicated.  I encouraged her to exhaust all conservative measures prior to surgery and agreed that an endometriosis focused practice or specialist would be appropriate.    If she decides she want to move forward with surgery with me, that would be done in [place] with other subspecialty support available.  She would need a cystoscopy and urologic evaluation prior to surgery and I would need to see her imaging or repeat her imaging prior to surgery.      PLAN  No orders of the defined types were placed in this encounter.  Patient to decide if she wants to proceed with treatment with me, Dr. Cobian, or other endometriosis specialist  Patient need urology evaluation and  cystoscopy  Obtain pelvic MRI imaging if patient has surgery with me.  Will need urology and colorectal consults prior to surgery.          Víctor Ayala MD

## 2023-08-01 ENCOUNTER — PATIENT MESSAGE (OUTPATIENT)
Dept: PRIMARY CARE CLINIC | Facility: CLINIC | Age: 28
End: 2023-08-01
Payer: COMMERCIAL

## 2023-08-01 DIAGNOSIS — R31.9 URINARY TRACT INFECTION WITH HEMATURIA, SITE UNSPECIFIED: Primary | ICD-10-CM

## 2023-08-01 DIAGNOSIS — N39.0 URINARY TRACT INFECTION WITH HEMATURIA, SITE UNSPECIFIED: Primary | ICD-10-CM

## 2023-08-01 RX ORDER — NITROFURANTOIN 25; 75 MG/1; MG/1
100 CAPSULE ORAL 2 TIMES DAILY
Qty: 10 CAPSULE | Refills: 0 | Status: SHIPPED | OUTPATIENT
Start: 2023-08-01 | End: 2023-08-06

## 2023-08-08 ENCOUNTER — OFFICE VISIT (OUTPATIENT)
Dept: GYNECOLOGIC ONCOLOGY | Facility: CLINIC | Age: 28
End: 2023-08-08
Payer: COMMERCIAL

## 2023-08-08 VITALS
WEIGHT: 130.5 LBS | DIASTOLIC BLOOD PRESSURE: 74 MMHG | RESPIRATION RATE: 18 BRPM | HEIGHT: 65 IN | BODY MASS INDEX: 21.74 KG/M2 | HEART RATE: 84 BPM | SYSTOLIC BLOOD PRESSURE: 110 MMHG

## 2023-08-08 DIAGNOSIS — N80.9 ENDOMETRIOSIS DETERMINED BY LAPAROSCOPY: Primary | ICD-10-CM

## 2023-08-08 DIAGNOSIS — R19.00 PELVIC MASS: ICD-10-CM

## 2023-08-08 PROCEDURE — 99215 OFFICE O/P EST HI 40 MIN: CPT | Mod: S$GLB,,, | Performed by: OBSTETRICS & GYNECOLOGY

## 2023-08-08 PROCEDURE — 99215 PR OFFICE/OUTPT VISIT, EST, LEVL V, 40-54 MIN: ICD-10-PCS | Mod: S$GLB,,, | Performed by: OBSTETRICS & GYNECOLOGY

## 2023-08-16 ENCOUNTER — OFFICE VISIT (OUTPATIENT)
Dept: PRIMARY CARE CLINIC | Facility: CLINIC | Age: 28
End: 2023-08-16
Payer: COMMERCIAL

## 2023-08-16 VITALS
DIASTOLIC BLOOD PRESSURE: 68 MMHG | HEIGHT: 65 IN | SYSTOLIC BLOOD PRESSURE: 103 MMHG | WEIGHT: 132.38 LBS | HEART RATE: 58 BPM | BODY MASS INDEX: 22.06 KG/M2 | OXYGEN SATURATION: 99 %

## 2023-08-16 DIAGNOSIS — R19.00 PELVIC MASS IN FEMALE: ICD-10-CM

## 2023-08-16 DIAGNOSIS — K64.9 HEMORRHOIDS, UNSPECIFIED HEMORRHOID TYPE: Primary | ICD-10-CM

## 2023-08-16 PROCEDURE — 99214 OFFICE O/P EST MOD 30 MIN: CPT | Mod: S$GLB,,, | Performed by: INTERNAL MEDICINE

## 2023-08-16 PROCEDURE — 99214 PR OFFICE/OUTPT VISIT, EST, LEVL IV, 30-39 MIN: ICD-10-PCS | Mod: S$GLB,,, | Performed by: INTERNAL MEDICINE

## 2023-08-16 NOTE — PROGRESS NOTES
"Subjective:      Patient ID: Jhoana Yañez is a 27 y.o. female.    Chief Complaint: Follow-up (The patient states she is no longer having any UTI s/s. "The doctor from [place] says it may just be the mass pressing up again my bladder." )    HPI    Past Medical History:   Diagnosis Date    Anemia     Cyst of ovary, left 11/26/2018    Cyst of ovary, right 05/12/2020    Endometriosis     stage 4    Osteochondroma     LEFT DISTAL MEDIAL FEMUR-DR HALE       Patient with h/o endometriosis and a pre op was completed previously to get surgery by a Dr Cobian in [place]. She was having hematuria in the interim and was treated with abx twice. She denies further episodes of hematuria. She was referred to Dr Ayala to rule out malignancy. She is considering going back to Dr Cobian for surgery  Was recommended to see Urology and colorectal surgeon before surgery for endometriosis  She states she is sexually active and not using protection. States her menstrual cycles are regular       Dr. Cherry (OBGYN in [place]) performed cystectomy in 2020  Dr. Regi Covarrubias (OBGYN in [place])   Dr. Staley ([place]GY[place])   Dr. Angela Hager (GI)   Dr Gaston Sams and Tobi (hematology at [facility])         Review of Systems   Constitutional:  Negative for chills and fever.   HENT:  Negative for hearing loss.    Eyes:  Negative for blurred vision.   Respiratory:  Negative for cough, shortness of breath and wheezing.    Cardiovascular:  Negative for chest pain, palpitations and leg swelling.   Gastrointestinal:  Negative for abdominal pain, blood in stool, constipation, diarrhea, melena, nausea and vomiting.   Genitourinary:  Negative for dysuria, frequency, hematuria and urgency.   Musculoskeletal:  Negative for falls.   Skin:  Negative for rash.   Neurological:  Negative for dizziness and headaches.   Endo/Heme/Allergies:  Does not bruise/bleed easily.   Psychiatric/Behavioral:  Negative for depression. The patient is not " "nervous/anxious.      Objective:     Physical Exam  Vitals reviewed.   Constitutional:       Appearance: Normal appearance.   HENT:      Head: Normocephalic.      Mouth/Throat:      Mouth: Mucous membranes are moist.      Pharynx: Oropharynx is clear.   Eyes:      Extraocular Movements: Extraocular movements intact.      Conjunctiva/sclera: Conjunctivae normal.      Pupils: Pupils are equal, round, and reactive to light.   Musculoskeletal:      Right lower leg: No edema.      Left lower leg: No edema.   Skin:     General: Skin is warm.      Capillary Refill: Capillary refill takes less than 2 seconds.   Neurological:      General: No focal deficit present.      Mental Status: She is alert and oriented to person, place, and time.   Psychiatric:         Mood and Affect: Mood normal.       /68 (BP Location: Left arm, Patient Position: Sitting, BP Method: Medium (Automatic))   Pulse (!) 58   Ht 5' 5" (1.651 m)   Wt 60.1 kg (132 lb 6.4 oz)   SpO2 99%   BMI 22.03 kg/m²     Assessment:       ICD-10-CM ICD-9-CM   1. Hemorrhoids, unspecified hemorrhoid type  K64.9 455.6   2. Pelvic mass in female  R19.00 789.30       Plan:          1. Hemorrhoids, unspecified hemorrhoid type  -     Ambulatory referral/consult to Colorectal Surgery; Future; Expected date: 08/23/2023    2. Pelvic mass in female  -     Ambulatory referral/consult to Urology; Future; Expected date: 08/23/2023  -     Hepatic Function Panel; Future; Expected date: 08/16/2023       Labs for pre op again provided     RTC PRN        No future appointments.             "

## 2023-08-18 ENCOUNTER — TELEPHONE (OUTPATIENT)
Dept: PRIMARY CARE CLINIC | Facility: CLINIC | Age: 28
End: 2023-08-18
Payer: COMMERCIAL

## 2023-08-18 ENCOUNTER — PATIENT MESSAGE (OUTPATIENT)
Dept: PRIMARY CARE CLINIC | Facility: CLINIC | Age: 28
End: 2023-08-18
Payer: COMMERCIAL

## 2023-08-18 NOTE — TELEPHONE ENCOUNTER
----- Message from Ofelia Porras sent at 8/18/2023  2:08 PM CDT -----  Contact: Pt  Pt is calling rg getting a call from a Dr's office / states she was unaware of being referred for GI and can be reached at [phone]//thanks/dbw

## 2024-08-23 ENCOUNTER — TELEPHONE (OUTPATIENT)
Dept: PRIMARY CARE CLINIC | Facility: CLINIC | Age: 29
End: 2024-08-23
Payer: COMMERCIAL

## 2024-08-23 NOTE — TELEPHONE ENCOUNTER
GYN ----- Message from Sherie Antoine sent at 8/23/2024 10:22 AM CDT -----  Patient is calling in regards to will need referral sent to Dr. Geronimo Zafar Jr. At [facility]..please call her back at [phone]

## 2024-08-26 DIAGNOSIS — N80.9 ENDOMETRIOSIS DETERMINED BY LAPAROSCOPY: Primary | ICD-10-CM
